# Patient Record
Sex: FEMALE | Race: WHITE | HISPANIC OR LATINO | Employment: UNEMPLOYED | URBAN - METROPOLITAN AREA
[De-identification: names, ages, dates, MRNs, and addresses within clinical notes are randomized per-mention and may not be internally consistent; named-entity substitution may affect disease eponyms.]

---

## 2019-08-04 ENCOUNTER — HOSPITAL ENCOUNTER (EMERGENCY)
Facility: HOSPITAL | Age: 44
Discharge: HOME | End: 2019-08-04
Attending: EMERGENCY MEDICINE | Admitting: EMERGENCY MEDICINE
Payer: MEDICARE

## 2019-08-04 ENCOUNTER — APPOINTMENT (EMERGENCY)
Dept: RADIOLOGY | Facility: HOSPITAL | Age: 44
End: 2019-08-04
Payer: MEDICARE

## 2019-08-04 VITALS
HEART RATE: 64 BPM | WEIGHT: 180 LBS | RESPIRATION RATE: 17 BRPM | DIASTOLIC BLOOD PRESSURE: 100 MMHG | OXYGEN SATURATION: 100 % | HEIGHT: 70 IN | TEMPERATURE: 98.3 F | BODY MASS INDEX: 25.77 KG/M2 | SYSTOLIC BLOOD PRESSURE: 147 MMHG

## 2019-08-04 DIAGNOSIS — S93.401A RIGHT ANKLE SPRAIN: ICD-10-CM

## 2019-08-04 DIAGNOSIS — S82.899A: ICD-10-CM

## 2019-08-04 DIAGNOSIS — S82.852A TRIMALLEOLAR FRACTURE OF ANKLE, CLOSED, LEFT, INITIAL ENCOUNTER: Primary | ICD-10-CM
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
## 2019-08-04 LAB
ANION GAP SERPL CALCULATED.3IONS-SCNC: 6 MMOL/L (ref 4–13)
APTT PPP: 26 SECONDS (ref 23–37)
BASOPHILS # BLD AUTO: 0.04 THOUSANDS/ΜL (ref 0–0.1)
BASOPHILS NFR BLD AUTO: 1 % (ref 0–1)
BUN SERPL-MCNC: 19 MG/DL (ref 5–25)
CALCIUM SERPL-MCNC: 8.5 MG/DL (ref 8.3–10.1)
CHLORIDE SERPL-SCNC: 104 MMOL/L (ref 100–108)
CO2 SERPL-SCNC: 28 MMOL/L (ref 21–32)
CREAT SERPL-MCNC: 0.77 MG/DL (ref 0.6–1.3)
EOSINOPHIL # BLD AUTO: 0.06 THOUSAND/ΜL (ref 0–0.61)
EOSINOPHIL NFR BLD AUTO: 1 % (ref 0–6)
ERYTHROCYTE [DISTWIDTH] IN BLOOD BY AUTOMATED COUNT: 16 % (ref 11.6–15.1)
GFR SERPL CREATININE-BSD FRML MDRD: 95 ML/MIN/1.73SQ M
GLUCOSE SERPL-MCNC: 91 MG/DL (ref 65–140)
HCT VFR BLD AUTO: 33.7 % (ref 34.8–46.1)
HGB BLD-MCNC: 10.1 G/DL (ref 11.5–15.4)
IMM GRANULOCYTES # BLD AUTO: 0.04 THOUSAND/UL (ref 0–0.2)
IMM GRANULOCYTES NFR BLD AUTO: 1 % (ref 0–2)
INR PPP: 1 (ref 0.84–1.19)
LYMPHOCYTES # BLD AUTO: 1.07 THOUSANDS/ΜL (ref 0.6–4.47)
LYMPHOCYTES NFR BLD AUTO: 13 % (ref 14–44)
MCH RBC QN AUTO: 26.4 PG (ref 26.8–34.3)
MCHC RBC AUTO-ENTMCNC: 30 G/DL (ref 31.4–37.4)
MCV RBC AUTO: 88 FL (ref 82–98)
MONOCYTES # BLD AUTO: 0.7 THOUSAND/ΜL (ref 0.17–1.22)
MONOCYTES NFR BLD AUTO: 8 % (ref 4–12)
NEUTROPHILS # BLD AUTO: 6.67 THOUSANDS/ΜL (ref 1.85–7.62)
NEUTS SEG NFR BLD AUTO: 76 % (ref 43–75)
NRBC BLD AUTO-RTO: 0 /100 WBCS
PLATELET # BLD AUTO: 290 THOUSANDS/UL (ref 149–390)
PMV BLD AUTO: 9.2 FL (ref 8.9–12.7)
POTASSIUM SERPL-SCNC: 3.5 MMOL/L (ref 3.5–5.3)
PROTHROMBIN TIME: 12.6 SECONDS (ref 11.6–14.5)
RBC # BLD AUTO: 3.83 MILLION/UL (ref 3.81–5.12)
SODIUM SERPL-SCNC: 138 MMOL/L (ref 136–145)
WBC # BLD AUTO: 8.58 THOUSAND/UL (ref 4.31–10.16)

## 2019-08-04 PROCEDURE — 85610 PROTHROMBIN TIME: CPT | Performed by: EMERGENCY MEDICINE

## 2019-08-04 PROCEDURE — 96361 HYDRATE IV INFUSION ADD-ON: CPT

## 2019-08-04 PROCEDURE — 80048 BASIC METABOLIC PNL TOTAL CA: CPT | Performed by: EMERGENCY MEDICINE

## 2019-08-04 PROCEDURE — 96360 HYDRATION IV INFUSION INIT: CPT

## 2019-08-04 PROCEDURE — 73610 X-RAY EXAM OF ANKLE: CPT

## 2019-08-04 PROCEDURE — 99285 EMERGENCY DEPT VISIT HI MDM: CPT | Performed by: EMERGENCY MEDICINE

## 2019-08-04 PROCEDURE — 85025 COMPLETE CBC W/AUTO DIFF WBC: CPT | Performed by: EMERGENCY MEDICINE

## 2019-08-04 PROCEDURE — 36415 COLL VENOUS BLD VENIPUNCTURE: CPT | Performed by: EMERGENCY MEDICINE

## 2019-08-04 PROCEDURE — 99284 EMERGENCY DEPT VISIT MOD MDM: CPT

## 2019-08-04 PROCEDURE — 85730 THROMBOPLASTIN TIME PARTIAL: CPT | Performed by: EMERGENCY MEDICINE

## 2019-08-04 PROCEDURE — 99152 MOD SED SAME PHYS/QHP 5/>YRS: CPT | Performed by: EMERGENCY MEDICINE

## 2019-08-04 PROCEDURE — 27818 TREATMENT OF ANKLE FRACTURE: CPT | Performed by: EMERGENCY MEDICINE

## 2019-08-04 RX ORDER — FENTANYL CITRATE 50 UG/ML
50 INJECTION, SOLUTION INTRAMUSCULAR; INTRAVENOUS ONCE
Status: COMPLETED | OUTPATIENT
Start: 2019-08-04 | End: 2019-08-04

## 2019-08-04 RX ORDER — OXYCODONE HYDROCHLORIDE AND ACETAMINOPHEN 5; 325 MG/1; MG/1
1 TABLET ORAL EVERY 4 HOURS PRN
Qty: 20 TABLET | Refills: 0 | Status: SHIPPED | OUTPATIENT
Start: 2019-08-04 | End: 2019-08-14

## 2019-08-04 RX ORDER — HYDROCHLOROTHIAZIDE 12.5 MG/1
12.5 TABLET ORAL DAILY
COMMUNITY
End: 2021-05-05 | Stop reason: SDUPTHER

## 2019-08-04 RX ORDER — PROPOFOL 10 MG/ML
100 INJECTION, EMULSION INTRAVENOUS ONCE
Status: COMPLETED | OUTPATIENT
Start: 2019-08-04 | End: 2019-08-04

## 2019-08-04 RX ORDER — BUPROPION HYDROCHLORIDE 200 MG/1
200 TABLET, EXTENDED RELEASE ORAL DAILY
COMMUNITY

## 2019-08-04 RX ORDER — LISINOPRIL 10 MG/1
10 TABLET ORAL DAILY
COMMUNITY

## 2019-08-04 RX ADMIN — PROPOFOL 100 MG: 10 INJECTION, EMULSION INTRAVENOUS at 16:35

## 2019-08-04 RX ADMIN — FENTANYL CITRATE 50 MCG: 50 INJECTION, SOLUTION INTRAMUSCULAR; INTRAVENOUS at 16:40

## 2019-08-04 RX ADMIN — SODIUM CHLORIDE 1000 ML: 0.9 INJECTION, SOLUTION INTRAVENOUS at 15:53

## 2019-08-04 NOTE — ED PROVIDER NOTES
History  Chief Complaint   Patient presents with    Ankle Injury     Pt  c/o left ankle pain after walking down one step and falling last night  Pt  took ibuprofen about two hours ago  59-year-old female presents the emergency department for evaluation of severe left ankle pain  Patient states that she tripped when walking down 1 step last night  She fell and twisted the left ankle  She has developed severe pain and swelling of the ankle since the fall  Patient did admit to drinking alcohol last night at the time of the fall  She denies head injury  No neck pain  She does notice mild right ankle pain as well but states that she has been ambulating on the right leg only today as she has not been able to bear weight on the left leg  Patient states that she had a previous left ankle fracture  She did not have previous surgery for the left ankle fracture and states that it was injured when she was a teenager  History provided by:  Patient and medical records   used: No    Ankle Injury   Location:  Left ankle  Quality:  Pain  Severity:  Severe  Onset quality:  Sudden  Timing:  Constant  Progression:  Unchanged  Chronicity:  New  Context:  Patient tripped while intoxicated  Relieved by:  Nothing  Worsened by: Movement  Ineffective treatments: Ice  Associated symptoms: no abdominal pain, no chest pain, no headaches, no nausea, no shortness of breath and no vomiting        Prior to Admission Medications   Prescriptions Last Dose Informant Patient Reported? Taking?    buPROPion (WELLBUTRIN SR) 200 MG 12 hr tablet   Yes Yes   Sig: Take 200 mg by mouth daily   hydrochlorothiazide (HYDRODIURIL) 12 5 mg tablet   Yes Yes   Sig: Take 12 5 mg by mouth daily   lisinopril (ZESTRIL) 10 mg tablet   Yes Yes   Sig: Take 10 mg by mouth daily      Facility-Administered Medications: None       Past Medical History:   Diagnosis Date    Hypertension     Psychiatric disorder        Past Surgical History:   Procedure Laterality Date    TUBAL LIGATION         No family history on file  I have reviewed and agree with the history as documented  Social History     Tobacco Use    Smoking status: Never Smoker   Substance Use Topics    Alcohol use: Yes     Comment: social     Drug use: Never        Review of Systems   Respiratory: Negative for shortness of breath  Cardiovascular: Negative for chest pain  Gastrointestinal: Negative for abdominal pain, nausea and vomiting  Musculoskeletal: Positive for gait problem and joint swelling  Negative for neck pain  Neurological: Negative for headaches  All other systems reviewed and are negative  Physical Exam  Physical Exam   Constitutional: She is oriented to person, place, and time  She appears well-developed and well-nourished  She appears distressed (Mild)  HENT:   Head: Normocephalic  Nose: Nose normal    Mouth/Throat: Oropharynx is clear and moist  No oropharyngeal exudate  Eyes: Pupils are equal, round, and reactive to light  Conjunctivae and EOM are normal    Neck: Normal range of motion  Neck supple  Cardiovascular: Normal rate, regular rhythm, normal heart sounds and intact distal pulses  Pulmonary/Chest: Effort normal and breath sounds normal    Abdominal: Soft  Bowel sounds are normal  She exhibits no distension  There is no tenderness  There is no rebound and no guarding  Musculoskeletal: She exhibits no edema  Right ankle: She exhibits swelling  She exhibits normal range of motion, no ecchymosis, no deformity, no laceration and normal pulse  Tenderness  Lateral malleolus tenderness found  No medial malleolus, no AITFL, no posterior TFL, no head of 5th metatarsal and no proximal fibula tenderness found  Achilles tendon normal         Left ankle: She exhibits decreased range of motion, swelling and deformity  She exhibits no laceration and normal pulse  Tenderness   Lateral malleolus and medial malleolus tenderness found  No head of 5th metatarsal and no proximal fibula tenderness found  Achilles tendon normal         Feet:    Lymphadenopathy:     She has no cervical adenopathy  Neurological: She is alert and oriented to person, place, and time  She has normal strength and normal reflexes  No cranial nerve deficit or sensory deficit  She exhibits normal muscle tone  Coordination and gait normal    Skin: Skin is warm, dry and intact  No rash noted  Psychiatric: She has a normal mood and affect  Her behavior is normal  Judgment and thought content normal    Nursing note and vitals reviewed        Vital Signs  ED Triage Vitals   Temperature Pulse Respirations Blood Pressure SpO2   08/04/19 1453 08/04/19 1453 08/04/19 1453 08/04/19 1453 08/04/19 1453   98 3 °F (36 8 °C) 73 18 122/79 100 %      Temp Source Heart Rate Source Patient Position - Orthostatic VS BP Location FiO2 (%)   08/04/19 1453 08/04/19 1636 -- 08/04/19 1636 --   Oral Monitor  Left arm       Pain Score       08/04/19 1453       Worst Possible Pain           Vitals:    08/04/19 1654 08/04/19 1656 08/04/19 1659 08/04/19 1800   BP: 135/79   147/100   Pulse: 67 70 68 64         Visual Acuity      ED Medications  Medications   propofol (DIPRIVAN) 200 MG/20ML bolus injection 100 mg (100 mg Intravenous Given by Other 8/4/19 1635)   sodium chloride 0 9 % bolus 1,000 mL (0 mL Intravenous Stopped 8/4/19 1850)   fentanyl citrate (PF) 100 MCG/2ML 50 mcg (50 mcg Intravenous Given 8/4/19 1640)       Diagnostic Studies  Results Reviewed     Procedure Component Value Units Date/Time    Basic metabolic panel [[id number]] Collected:  08/04/19 1608    Lab Status:  Final result Specimen:  Blood from Arm, Left Updated:  08/04/19 1629     Sodium 138 mmol/L      Potassium 3 5 mmol/L      Chloride 104 mmol/L      CO2 28 mmol/L      ANION GAP 6 mmol/L      BUN 19 mg/dL      Creatinine 0 77 mg/dL      Glucose 91 mg/dL      Calcium 8 5 mg/dL      eGFR 95 ml/min/1 73sq m     Narrative: National Kidney Disease Foundation guidelines for Chronic Kidney Disease (CKD):     Stage 1 with normal or high GFR (GFR > 90 mL/min/1 73 square meters)    Stage 2 Mild CKD (GFR = 60-89 mL/min/1 73 square meters)    Stage 3A Moderate CKD (GFR = 45-59 mL/min/1 73 square meters)    Stage 3B Moderate CKD (GFR = 30-44 mL/min/1 73 square meters)    Stage 4 Severe CKD (GFR = 15-29 mL/min/1 73 square meters)    Stage 5 End Stage CKD (GFR <15 mL/min/1 73 square meters)  Note: GFR calculation is accurate only with a steady state creatinine    Protime-INR [[id number]]  (Normal) Collected:  08/04/19 1555    Lab Status:  Final result Specimen:  Blood from Arm, Right Updated:  08/04/19 1623     Protime 12 6 seconds      INR 1 00    APTT [[id number]]  (Normal) Collected:  08/04/19 1555    Lab Status:  Final result Specimen:  Blood from Arm, Right Updated:  08/04/19 1623     PTT 26 seconds     CBC and differential [[id number]]  (Abnormal) Collected:  08/04/19 1608    Lab Status:  Final result Specimen:  Blood from Arm, Left Updated:  08/04/19 1615     WBC 8 58 Thousand/uL      RBC 3 83 Million/uL      Hemoglobin 10 1 g/dL      Hematocrit 33 7 %      MCV 88 fL      MCH 26 4 pg      MCHC 30 0 g/dL      RDW 16 0 %      MPV 9 2 fL      Platelets 435 Thousands/uL      nRBC 0 /100 WBCs      Neutrophils Relative 76 %      Immat GRANS % 1 %      Lymphocytes Relative 13 %      Monocytes Relative 8 %      Eosinophils Relative 1 %      Basophils Relative 1 %      Neutrophils Absolute 6 67 Thousands/µL      Immature Grans Absolute 0 04 Thousand/uL      Lymphocytes Absolute 1 07 Thousands/µL      Monocytes Absolute 0 70 Thousand/µL      Eosinophils Absolute 0 06 Thousand/µL      Basophils Absolute 0 04 Thousands/µL                  XR ankle 3+ views RIGHT   ED Interpretation by Adam Hutchinson DO (08/04 1807)   Soft tissue swelling no fracture      XR ankle 3+ views LEFT   ED Interpretation by Adam Hutchinson DO (08/04 1750)   Reduction of trimall fx and dislocation      XR ankle 3+ views LEFT   ED Interpretation by Lorin Macario DO (08/04 1533)   trimall fracture with dislocation                 Procedures  Orthopedic injury treatment  Date/Time: 8/4/2019 4:58 PM  Performed by: Lorin Macario DO  Authorized by: Lorin Macario DO     Patient Location:  ED  Other Assisting Provider: Yes (comment) Braydon Sun MD)    Verbal consent obtained?: Yes    Written consent obtained?: Yes    Risks and benefits: Risks, benefits and alternatives were discussed    Consent given by:  Patient  Patient states understanding of procedure being performed: Yes    Patient's understanding of procedure matches consent: Yes    Radiology Images displayed and confirmed  If images not available, report reviewed: Yes    Patient identity confirmed:  Verbally with patient  Time out: Immediately prior to the procedure a time out was called    Injury location:  Ankle  Location details:  Left ankle  Injury type:  Fracture-dislocation  Neurovascular status: Neurovascularly intact    Distal perfusion: normal    Neurological function: normal    Range of motion: reduced    Local anesthesia used?: No    General anesthesia used?: No    Sedation type:   Moderate (conscious) sedation (See separate Procedural Sedation form)  Manipulation performed?: Yes    Skin traction used?: No    Skeletal traction used?: No    Reduction successful?: Yes    Confirmation: Reduction confirmed by x-ray    Immobilization:  Splint  Splint type:  Short leg  Supplies used:  Ortho-Glass  Neurovascular status: Neurovascularly intact    Distal perfusion: normal    Neurological function: normal    Range of motion: improved    Patient tolerance:  Patient tolerated the procedure well with no immediate complications    Pre-Procedural Sedation  Performed by: Lorin Macario DO  Authorized by: Lorin Macario DO     Consent:     Consent obtained:  Verbal and written    Consent given by:  Patient    Risks discussed:  Inadequate sedation, dysrhythmia, allergic reaction, respiratory compromise necessitating ventilatory assistance and intubation, vomiting and nausea    Alternatives discussed:  Analgesia without sedation  Universal protocol:     Procedure explained and questions answered to patient or proxy's satisfaction: yes      Radiology Images displayed and confirmed    If images not available, report reviewed: yes      Immediately prior to procedure a time out was called: yes      Patient identity confirmation method:  Verbally with patient and arm band  Indications:     Sedation purpose:  Dislocation reduction    Procedure necessitating sedation performed by:  Different physician    Intended level of sedation:  Moderate (conscious sedation)  Pre-sedation assessment:     NPO status caution: urgency dictates proceeding with non-ideal NPO status      ASA classification: class 1 - normal, healthy patient      Neck mobility: normal      Mouth opening:  3 or more finger widths    Thyromental distance:  3 finger widths    Mallampati score:  I - soft palate, uvula, fauces, pillars visible    Pre-sedation assessments completed and reviewed: airway patency, cardiovascular function, hydration status, mental status, nausea/vomiting, pain level, respiratory function and temperature      History of difficult intubation: no      Pre-sedation assessment completed:  8/4/2019 4:22 PM  Procedural Sedation  Date/Time: 8/4/2019 4:52 PM  Performed by: Meredith Mitchell DO  Authorized by: Meredith Mitchell DO     Immediate pre-procedure details:     Reassessment: Patient reassessed immediately prior to procedure      Reviewed: vital signs      Verified: bag valve mask available, emergency equipment available, intubation equipment available, IV patency confirmed, oxygen available and suction available    Procedure details (see MAR for exact dosages):     Preoxygenation:  Room air    Sedation:  Propofol    Analgesia:  Fentanyl    Intra-procedure monitoring:  Blood pressure monitoring, cardiac monitor, continuous capnometry, continuous pulse oximetry, frequent LOC assessments and frequent vital sign checks    Intra-procedure events: none    Post-procedure details:     Post-sedation assessment completed:  8/4/2019 5:35 PM    Attendance: Constant attendance by certified staff until patient recovered      Recovery: Patient returned to pre-procedure baseline      Post-sedation assessments completed and reviewed: airway patency, cardiovascular function, hydration status, mental status, nausea/vomiting, pain level, respiratory function and temperature      Patient is stable for discharge or admission: yes      Patient tolerance: Tolerated well, no immediate complications  Splint application  Date/Time: 8/4/2019 4:56 PM  Performed by: Cassidy Olivares DO  Authorized by: Cassidy Olivares DO     Patient location:  Bedside  Procedure performed by emergency physician: Yes    Other Assisting Provider: Yes (comment)    Consent:     Consent obtained:  Verbal    Consent given by:  Patient    Risks discussed:  Numbness, pain and swelling    Alternatives discussed:  No treatment  Universal protocol:     Patient identity confirmed:  Verbally with patient and arm band  Indication:     Indications: fracture    Pre-procedure details:     Sensation:  Normal  Procedure details:     Laterality:  Left    Location:  Ankle    Ankle:  L ankle    Splint type:  Short leg and sugar tong    Supplies:  Ortho-Glass  Post-procedure details:     Pain:  Improved    Sensation:  Normal    Neurovascular Exam: skin pink, capillary refill <2 sec, normal pulses and skin intact, warm, and dry      Patient tolerance of procedure:   Tolerated well, no immediate complications      Conscious Sedation Assessment      Classification Score   ASA Scale Assessment  1-Healthy patient, no disease outside surgical process filed at 08/04/2019 1643           ED Course  ED Course as of Aug 04 2057   Alen Munson Aug 04, 2019   1802 Case reviewed with Dr Alexa Poon, pt  Can be d/c home with o/p follow up                                  MDM  Number of Diagnoses or Management Options  Closed fracture dislocation of ankle: new and requires workup  Fall: new and requires workup  Right ankle sprain: new and requires workup  Trimalleolar fracture of ankle, closed, left, initial encounter: new and requires workup     Amount and/or Complexity of Data Reviewed  Clinical lab tests: ordered and reviewed  Tests in the radiology section of CPT®: reviewed and ordered  Decide to obtain previous medical records or to obtain history from someone other than the patient: yes  Discuss the patient with other providers: yes (Dr Mar Mclain)  Independent visualization of images, tracings, or specimens: yes    Risk of Complications, Morbidity, and/or Mortality  General comments: 42-year-old female presents with left ankle deformity after falling down 1 step last night  Patient has a fracture dislocation that was successfully reduced and splinted in the emergency department  Patient's pain was well controlled and she will be discharged with pain medication  I have personally queried the [place] regarding this patient and I feel it is warranted to prescribe this patient the narcotic or benzodiazepine medications given at discharge       Patient Progress  Patient progress: improved      Disposition  Final diagnoses:   Trimalleolar fracture of ankle, closed, left, initial encounter   Closed fracture dislocation of ankle - left   Right ankle sprain   Fall     Time reflects when diagnosis was documented in both MDM as applicable and the Disposition within this note     Time User Action Codes Description Comment    8/4/2019  6:09 PM Mayra Avendaño Add [P28 056S] Trimalleolar fracture of ankle, closed, left, initial encounter     8/4/2019  6:09 PM Nidhi Ruelas Add [S93 05XA] Ankle dislocation, left, initial encounter     8/4/2019  6:10 PM Nidhi Ruelas Add [S82 [id number]] Closed fracture dislocation of ankle     8/4/2019  6:10 PM Nidhi Ruelas Remove [S93 05XA] Ankle dislocation, left, initial encounter     8/4/2019  6:10 PM Ivone Sharif Modify [B26 [id number]] Closed fracture dislocation of ankle left    8/4/2019  6:10 PM Nidhi Ruelas Add [S93 401A] Right ankle sprain     8/4/2019  6:10 PM Nidhi Ruelas Add [W19  XXXA] Fall       ED Disposition     ED Disposition Condition Date/Time Comment    Discharge Stable Sun Aug 4, 2019  6:08 PM John  discharge to home/self care  Follow-up Information     Follow up With Specialties Details Why Contact Info Additional [address] [facility] [place] [facility] Schedule an appointment as soon as possible for a visit in 1 day for fracture evaluation and treatment [address] [place] [phone] [facility] [address], [address] [place], [place], [address]          Discharge Medication List as of 8/4/2019  6:12 PM      START taking these medications    Details   oxyCODONE-acetaminophen (PERCOCET) 5-325 mg per tablet Take 1 tablet by mouth every 4 (four) hours as needed for moderate pain for up to 10 daysMax Daily Amount: 6 tablets, Starting Sun 8/4/2019, Until Wed 8/14/2019, Print         CONTINUE these medications which have NOT CHANGED    Details   buPROPion (WELLBUTRIN SR) 200 MG 12 hr tablet Take 200 mg by mouth daily, Historical Med      hydrochlorothiazide (HYDRODIURIL) 12 5 mg tablet Take 12 5 mg by mouth daily, Historical Med      lisinopril (ZESTRIL) 10 mg tablet Take 10 mg by mouth daily, Historical Med           No discharge procedures on file      ED Provider  Electronically Signed by           Temo Locke DO  08/04/19 9933

## 2019-09-11 ENCOUNTER — TELEPHONE (OUTPATIENT)
Dept: OBGYN CLINIC | Facility: CLINIC | Age: 44
End: 2019-09-11

## 2019-11-15 ENCOUNTER — TRANSCRIBE ORDERS (OUTPATIENT)
Dept: ADMINISTRATIVE | Facility: HOSPITAL | Age: 44
End: 2019-11-15

## 2019-11-15 DIAGNOSIS — N94.10 PAIN IN FEMALE GENITALIA ON INTERCOURSE: ICD-10-CM

## 2019-11-15 DIAGNOSIS — N92.0 EXCESSIVE OR FREQUENT MENSTRUATION: ICD-10-CM

## 2019-11-15 DIAGNOSIS — N94.6 MENORRHALGIA: Primary | ICD-10-CM

## 2019-12-29 ENCOUNTER — APPOINTMENT (OUTPATIENT)
Dept: LAB | Facility: HOSPITAL | Age: 44
End: 2019-12-29
Payer: MEDICARE

## 2019-12-29 ENCOUNTER — TRANSCRIBE ORDERS (OUTPATIENT)
Dept: LAB | Facility: HOSPITAL | Age: 44
End: 2019-12-29

## 2019-12-29 ENCOUNTER — HOSPITAL ENCOUNTER (OUTPATIENT)
Dept: RADIOLOGY | Facility: HOSPITAL | Age: 44
Discharge: HOME | End: 2019-12-29
Payer: MEDICARE

## 2019-12-29 DIAGNOSIS — N94.10 PAIN IN FEMALE GENITALIA ON INTERCOURSE: ICD-10-CM

## 2019-12-29 DIAGNOSIS — N94.6 MENORRHALGIA: ICD-10-CM

## 2019-12-29 DIAGNOSIS — E66.3 SEVERELY OVERWEIGHT: ICD-10-CM

## 2019-12-29 DIAGNOSIS — N92.0 EXCESSIVE OR FREQUENT MENSTRUATION: ICD-10-CM

## 2019-12-29 DIAGNOSIS — I10 ESSENTIAL HYPERTENSION, MALIGNANT: Primary | ICD-10-CM

## 2019-12-29 DIAGNOSIS — I10 ESSENTIAL HYPERTENSION, MALIGNANT: ICD-10-CM

## 2019-12-29 LAB
ALBUMIN SERPL BCP-MCNC: 3.3 G/DL (ref 3.5–5)
ALP SERPL-CCNC: 81 U/L (ref 46–116)
ALT SERPL W P-5'-P-CCNC: 20 U/L (ref 12–78)
ANION GAP SERPL CALCULATED.3IONS-SCNC: 3 MMOL/L (ref 4–13)
AST SERPL W P-5'-P-CCNC: 11 U/L (ref 5–45)
BASOPHILS # BLD AUTO: 0.03 THOUSANDS/ΜL (ref 0–0.1)
BASOPHILS NFR BLD AUTO: 0 % (ref 0–1)
BILIRUB SERPL-MCNC: 0.27 MG/DL (ref 0.2–1)
BUN SERPL-MCNC: 11 MG/DL (ref 5–25)
CALCIUM SERPL-MCNC: 8.7 MG/DL (ref 8.3–10.1)
CHLORIDE SERPL-SCNC: 107 MMOL/L (ref 100–108)
CHOLEST SERPL-MCNC: 154 MG/DL (ref 50–200)
CO2 SERPL-SCNC: 28 MMOL/L (ref 21–32)
CREAT SERPL-MCNC: 0.76 MG/DL (ref 0.6–1.3)
EOSINOPHIL # BLD AUTO: 0.17 THOUSAND/ΜL (ref 0–0.61)
EOSINOPHIL NFR BLD AUTO: 2 % (ref 0–6)
ERYTHROCYTE [DISTWIDTH] IN BLOOD BY AUTOMATED COUNT: 16.5 % (ref 11.6–15.1)
EST. AVERAGE GLUCOSE BLD GHB EST-MCNC: 91 MG/DL
FERRITIN SERPL-MCNC: 6 NG/ML (ref 8–388)
GFR SERPL CREATININE-BSD FRML MDRD: 96 ML/MIN/1.73SQ M
GLUCOSE P FAST SERPL-MCNC: 90 MG/DL (ref 65–99)
HBA1C MFR BLD: 4.8 % (ref 4.2–6.3)
HCT VFR BLD AUTO: 30.2 % (ref 34.8–46.1)
HDLC SERPL-MCNC: 48 MG/DL
HGB BLD-MCNC: 8.8 G/DL (ref 11.5–15.4)
IMM GRANULOCYTES # BLD AUTO: 0.02 THOUSAND/UL (ref 0–0.2)
IMM GRANULOCYTES NFR BLD AUTO: 0 % (ref 0–2)
IRON SATN MFR SERPL: 5 %
IRON SERPL-MCNC: 20 UG/DL (ref 50–170)
LDLC SERPL CALC-MCNC: 92 MG/DL (ref 0–100)
LYMPHOCYTES # BLD AUTO: 1.57 THOUSANDS/ΜL (ref 0.6–4.47)
LYMPHOCYTES NFR BLD AUTO: 20 % (ref 14–44)
MCH RBC QN AUTO: 24.2 PG (ref 26.8–34.3)
MCHC RBC AUTO-ENTMCNC: 29.1 G/DL (ref 31.4–37.4)
MCV RBC AUTO: 83 FL (ref 82–98)
MONOCYTES # BLD AUTO: 0.49 THOUSAND/ΜL (ref 0.17–1.22)
MONOCYTES NFR BLD AUTO: 6 % (ref 4–12)
NEUTROPHILS # BLD AUTO: 5.43 THOUSANDS/ΜL (ref 1.85–7.62)
NEUTS SEG NFR BLD AUTO: 72 % (ref 43–75)
NONHDLC SERPL-MCNC: 106 MG/DL
NRBC BLD AUTO-RTO: 0 /100 WBCS
PLATELET # BLD AUTO: 362 THOUSANDS/UL (ref 149–390)
PMV BLD AUTO: 9.8 FL (ref 8.9–12.7)
POTASSIUM SERPL-SCNC: 3.9 MMOL/L (ref 3.5–5.3)
PROT SERPL-MCNC: 6.8 G/DL (ref 6.4–8.2)
RBC # BLD AUTO: 3.64 MILLION/UL (ref 3.81–5.12)
SODIUM SERPL-SCNC: 138 MMOL/L (ref 136–145)
TIBC SERPL-MCNC: 391 UG/DL (ref 250–450)
TRIGL SERPL-MCNC: 68 MG/DL
WBC # BLD AUTO: 7.71 THOUSAND/UL (ref 4.31–10.16)

## 2019-12-29 PROCEDURE — 36415 COLL VENOUS BLD VENIPUNCTURE: CPT

## 2019-12-29 PROCEDURE — 82728 ASSAY OF FERRITIN: CPT

## 2019-12-29 PROCEDURE — 83036 HEMOGLOBIN GLYCOSYLATED A1C: CPT

## 2019-12-29 PROCEDURE — 76856 US EXAM PELVIC COMPLETE: CPT

## 2019-12-29 PROCEDURE — 76830 TRANSVAGINAL US NON-OB: CPT

## 2019-12-29 PROCEDURE — 83540 ASSAY OF IRON: CPT

## 2019-12-29 PROCEDURE — 80053 COMPREHEN METABOLIC PANEL: CPT

## 2019-12-29 PROCEDURE — 80061 LIPID PANEL: CPT

## 2019-12-29 PROCEDURE — 85025 COMPLETE CBC W/AUTO DIFF WBC: CPT

## 2019-12-29 PROCEDURE — 83550 IRON BINDING TEST: CPT

## 2020-01-07 ENCOUNTER — OFFICE VISIT (OUTPATIENT)
Dept: OBGYN CLINIC | Facility: CLINIC | Age: 45
End: 2020-01-07
Payer: MEDICARE

## 2020-01-07 VITALS
WEIGHT: 183 LBS | SYSTOLIC BLOOD PRESSURE: 142 MMHG | DIASTOLIC BLOOD PRESSURE: 90 MMHG | BODY MASS INDEX: 26.2 KG/M2 | HEIGHT: 70 IN

## 2020-01-07 DIAGNOSIS — D25.1 INTRAMURAL LEIOMYOMA OF UTERUS: ICD-10-CM

## 2020-01-07 DIAGNOSIS — N92.0 MENORRHAGIA WITH REGULAR CYCLE: Primary | ICD-10-CM

## 2020-01-07 DIAGNOSIS — D25.2 FIBROIDS, SUBSEROUS: ICD-10-CM

## 2020-01-07 PROBLEM — S82.852D CLOSED TRIMALLEOLAR FRACTURE OF LEFT ANKLE WITH ROUTINE HEALING: Status: ACTIVE | Noted: 2019-08-08

## 2020-01-07 PROBLEM — S82.899A ANKLE FRACTURE: Status: ACTIVE | Noted: 2019-08-19

## 2020-01-07 PROCEDURE — 99203 OFFICE O/P NEW LOW 30 MIN: CPT | Performed by: NURSE PRACTITIONER

## 2020-01-07 RX ORDER — GABAPENTIN 300 MG/1
300 CAPSULE ORAL
COMMUNITY
Start: 2019-12-31 | End: 2021-05-05 | Stop reason: SDUPTHER

## 2020-01-07 RX ORDER — TRAMADOL HYDROCHLORIDE 50 MG/1
50 TABLET ORAL EVERY 6 HOURS PRN
COMMUNITY
Start: 2019-10-24 | End: 2020-10-23

## 2020-01-07 RX ORDER — LORATADINE 10 MG/1
10 TABLET ORAL DAILY
COMMUNITY
Start: 2019-12-31

## 2020-01-07 NOTE — PROGRESS NOTES
Hiral Snyder 40year-old  with 3 prior vaginal deliveries  She recently moved from [facility]  She has been seen at the [facility] in [place] on [address]  She was referred here for further evaluation after US findings of a fibroid uterus  She presents with complaint of heavy menses which started 1 year ago  Her menses are every 28 days and they now last 10 days  She is changing her pads every hour the first 4 days and then the flow slows down and she can change less frequently  She passes clots quarter size  She has associated pelvic pain  She has a history of tubal ligation  Recent US on 19 ordered by the [facility]   Impression:    1  Heterogeneous uterus with at least two, though possibly more, fibroids abutting the endometrium suggesting submucosal components  As there are also myometrial cyst(s), there may be coexisting adenomyosis  If clinically indicated, evaluation of   fibroids and underlying adenomyosis would be better performed with MRI      2  Hypoechoic mass in the cervix along the endocervical canal   This is likely a fibroid given similarity in appearance to the other lesions and is either submucosal or possibly intracavitary  Endocervical polyp is also possible  This would also be   better evaluated with MRI  Last pap smear: Summer 2019 in [facility] - normal per patient  ROS:  As indicated in HPI  All other ROS negative  Physical Exam   Constitutional: Vital signs are normal  She appears well-developed and well-nourished  Genitourinary: Vagina normal  Pelvic exam was performed with patient supine  There is no rash, tenderness, lesion or injury on the right labia  There is no rash, tenderness, lesion or injury on the left labia  Right adnexum does not display mass, does not display tenderness and does not display fullness  Left adnexum does not display mass, does not display tenderness and does not display fullness  Cervix is parous   Cervix does not exhibit motion tenderness or discharge  Uterus is enlarged  Genitourinary Comments: Fibroid uterus palpated on bi-manual exam    HENT:   Head: Normocephalic and atraumatic  Neck: Neck supple  Neurological: She is alert  Skin: Skin is warm and dry  Nursing note and vitals reviewed  Kade Sawyer was seen today for fibroids and menorrhagia  Diagnoses and all orders for this visit:    Menorrhagia with regular cycle  -     Ambulatory referral to Gynecology; Future    Intramural leiomyoma of uterus  -     Ambulatory referral to Gynecology; Future    Fibroids, subserous  -     Ambulatory referral to Gynecology; Future      I recommended patient have a consultation with Dr Diana Acevedo for evaluation and treatment of her fibroid uterus  Patient agrees with plan  A referral was provided

## 2020-01-14 ENCOUNTER — TELEPHONE (OUTPATIENT)
Dept: OTHER | Facility: HOSPITAL | Age: 45
End: 2020-01-14

## 2020-01-14 NOTE — TELEPHONE ENCOUNTER
Patient was called to discuss appointment scheduled for today  She was seen by Dr Sissy Caceres office and follow up evaluation was recommended with Dr Geoffrey Lang due to her fibroid uterus  Patient scheduled an appointment with [facility] because she was informed that we are the same types of doctors  It was recommended that she follow up with a specialist as previously referred  Patient agreed  All questions answered at this time  Carmen Ramos MD  OB/GYN  1/14/2020  12:30 PM

## 2020-01-23 ENCOUNTER — CONSULT (OUTPATIENT)
Dept: GYNECOLOGY | Facility: CLINIC | Age: 45
End: 2020-01-23
Payer: MEDICARE

## 2020-01-23 VITALS
SYSTOLIC BLOOD PRESSURE: 138 MMHG | WEIGHT: 206 LBS | HEIGHT: 70 IN | BODY MASS INDEX: 29.49 KG/M2 | DIASTOLIC BLOOD PRESSURE: 80 MMHG | HEART RATE: 97 BPM

## 2020-01-23 DIAGNOSIS — D21.9 FIBROIDS: ICD-10-CM

## 2020-01-23 DIAGNOSIS — N92.0 MENORRHAGIA WITH REGULAR CYCLE: Primary | ICD-10-CM

## 2020-01-23 DIAGNOSIS — N80.0 ADENOMYOSIS: ICD-10-CM

## 2020-01-23 PROBLEM — D64.9 ANEMIA: Status: ACTIVE | Noted: 2020-01-23

## 2020-01-23 PROBLEM — D25.9 UTERINE LEIOMYOMA: Status: ACTIVE | Noted: 2020-01-23

## 2020-01-23 PROCEDURE — 88305 TISSUE EXAM BY PATHOLOGIST: CPT | Performed by: PATHOLOGY

## 2020-01-23 PROCEDURE — 58100 BIOPSY OF UTERUS LINING: CPT | Performed by: OBSTETRICS & GYNECOLOGY

## 2020-01-23 PROCEDURE — 99204 OFFICE O/P NEW MOD 45 MIN: CPT | Performed by: OBSTETRICS & GYNECOLOGY

## 2020-01-23 NOTE — PROGRESS NOTES
Assessment/Plan    Diagnoses and all orders for this visit:    Menorrhagia with regular cycle  -     Tissue Exam    Fibroids    Adenomyosis      Discussed options including medical management versus surgical management  We discussed hysteroscopic myomectomy with or without endometrial ablation versus hysterectomy  We discussed supracervical versus total hysterectomy both with bilateral salpingectomy  We discussed the risks of the surgery including but not limited to infection, hemorrhage, bowel, bladder, vascular injury, possible necessity for laparotomy  Patient is opted proceed with an [facility]  Subjective:      Patient ID: Kota Castillo is a 40 y o  female  HPI  P3 referred to office by JEANNINE Quevedo/ Dr Yamel Gallego secondary to menorrhagia and anemia  Patient states that over the past year she has been experiencing extremely heavy menses with passage of large clots  This is associated with dysmenorrhea and lower back pain and increasing pelvic pressure  Recent hemoglobin was 8 8  Patient had an ultrasound which showed 3 fibroids the largest measuring 3 21 7 and 2 9 cm  The 2 9 cm suggested either cervical or submucosal fibroid  The ultrasound also suggested adenomyosis  Patient has had a prior tubal    The following portions of the patient's history were reviewed and updated as appropriate:   She  has a past medical history of Anemia, Fibroid uterus, Hypertension, Hypertension, and Psychiatric disorder  She   Patient Active Problem List    Diagnosis Date Noted    Ankle fracture 08/19/2019    Closed trimalleolar fracture of left ankle with routine healing 08/08/2019     She  has a past surgical history that includes Tubal ligation  Her family history includes Diabetes in her brother; Hypertension in her father and mother  She  reports that she has never smoked  She has never used smokeless tobacco  She reports that she drinks alcohol  She reports that she does not use drugs    Current Outpatient Medications Medication Sig Dispense Refill    buPROPion (WELLBUTRIN SR) 200 MG 12 hr tablet Take 200 mg by mouth daily      gabapentin (NEURONTIN) 300 mg capsule Take 300 mg by mouth daily at bedtime      hydrochlorothiazide (HYDRODIURIL) 12 5 mg tablet Take 12 5 mg by mouth daily      lisinopril (ZESTRIL) 10 mg tablet Take 10 mg by mouth daily      loratadine (CLARITIN) 10 mg tablet Take 10 mg by mouth daily      traMADol (ULTRAM) 50 mg tablet Take 50 mg by mouth every 6 (six) hours as needed       No current facility-administered medications for this visit  Current Outpatient Medications on File Prior to Visit   Medication Sig    buPROPion (WELLBUTRIN SR) 200 MG 12 hr tablet Take 200 mg by mouth daily    gabapentin (NEURONTIN) 300 mg capsule Take 300 mg by mouth daily at bedtime    hydrochlorothiazide (HYDRODIURIL) 12 5 mg tablet Take 12 5 mg by mouth daily    lisinopril (ZESTRIL) 10 mg tablet Take 10 mg by mouth daily    loratadine (CLARITIN) 10 mg tablet Take 10 mg by mouth daily    traMADol (ULTRAM) 50 mg tablet Take 50 mg by mouth every 6 (six) hours as needed     No current facility-administered medications on file prior to visit  She has No Known Allergies       Review of Systems   Constitutional: Negative  HENT: Negative for sore throat and trouble swallowing  Respiratory: Negative  Cardiovascular: Negative  Gastrointestinal: Negative  Genitourinary: Positive for menstrual problem and pelvic pain  Negative for difficulty urinating, dyspareunia, enuresis, frequency, genital sores, hematuria and urgency  Objective:      /80   Pulse 97   Ht 5' 10" (1 778 m)   Wt 93 4 kg (206 lb)   LMP 12/26/2019   BMI 29 56 kg/m²          Physical Exam   Constitutional: She appears well-nourished  Neck: Normal range of motion  Neck supple  No thyromegaly present  Cardiovascular: Normal rate, regular rhythm and normal heart sounds     Pulmonary/Chest: Effort normal and breath sounds normal  No respiratory distress  Abdominal: Soft  Bowel sounds are normal  She exhibits no distension and no mass  There is no tenderness  There is no rebound and no guarding  No hernia  Hernia confirmed negative in the right inguinal area and confirmed negative in the left inguinal area  Genitourinary: There is no rash, tenderness or lesion on the right labia  There is no rash, tenderness or lesion on the left labia  Uterus is enlarged  Uterus is not deviated, not fixed and not tender  Cervix exhibits no motion tenderness, no discharge and no friability  Right adnexum displays no mass, no tenderness and no fullness  Left adnexum displays no mass, no tenderness and no fullness  No erythema, tenderness or bleeding in the vagina  No vaginal discharge found  Lymphadenopathy:     She has no cervical adenopathy  No inguinal adenopathy noted on the right or left side  Endometrial biopsy  Cervix was prepped with Betadine  Pipelle was inserted into the endometrial cavity  Pipelle sounded to 7 cm  Pipelle was removed in a circumferential manner obtaining a sample from all 4 quadrants    Patient tolerated procedure well

## 2020-01-23 NOTE — LETTER
January 23, 2020     Fernando Freitas, [address] [place] [address]    Patient: Sandy Garcia   YOB: 1975   Date of Visit: 1/23/2020       Dear Dr Osmany Morales: Thank you for referring Sandy Garcia to me for evaluation  Below are my notes for this consultation  If you have questions, please do not hesitate to call me  I look forward to following your patient along with you  Sincerely,        Pearl Tirado DO        CC: MD Pearl Mckeon DO  1/23/2020 11:03 AM  Incomplete  Assessment/Plan    Diagnoses and all orders for this visit:    Menorrhagia with regular cycle  -     Tissue Exam    Fibroids    Adenomyosis      Discussed options including medical management versus surgical management  We discussed hysteroscopic myomectomy with or without endometrial ablation versus hysterectomy  We discussed supracervical versus total hysterectomy both with bilateral salpingectomy  We discussed the risks of the surgery including but not limited to infection, hemorrhage, bowel, bladder, vascular injury, possible necessity for laparotomy  Patient is opted proceed with an [facility] BS  Subjective:      Patient ID: Sandy Garcia is a 40 y o  female  HPI  P3 referred to office by JEANNINE Quevedo/ Dr Michell Zarate secondary to menorrhagia and anemia  Patient states that over the past year she has been experiencing extremely heavy menses with passage of large clots  This is associated with dysmenorrhea and lower back pain and increasing pelvic pressure  Recent hemoglobin was 8 8  Patient had an ultrasound which showed 3 fibroids the largest measuring 3 21 7 and 2 9 cm  The 2 9 cm suggested either cervical or submucosal fibroid  The ultrasound also suggested adenomyosis    Patient has had a prior tubal    The following portions of the patient's history were reviewed and updated as appropriate:   She  has a past medical history of Anemia, Fibroid uterus, Hypertension, Hypertension, and Psychiatric disorder  She   Patient Active Problem List    Diagnosis Date Noted    Ankle fracture 08/19/2019    Closed trimalleolar fracture of left ankle with routine healing 08/08/2019     She  has a past surgical history that includes Tubal ligation  Her family history includes Diabetes in her brother; Hypertension in her father and mother  She  reports that she has never smoked  She has never used smokeless tobacco  She reports that she drinks alcohol  She reports that she does not use drugs  Current Outpatient Medications   Medication Sig Dispense Refill    buPROPion (WELLBUTRIN SR) 200 MG 12 hr tablet Take 200 mg by mouth daily      gabapentin (NEURONTIN) 300 mg capsule Take 300 mg by mouth daily at bedtime      hydrochlorothiazide (HYDRODIURIL) 12 5 mg tablet Take 12 5 mg by mouth daily      lisinopril (ZESTRIL) 10 mg tablet Take 10 mg by mouth daily      loratadine (CLARITIN) 10 mg tablet Take 10 mg by mouth daily      traMADol (ULTRAM) 50 mg tablet Take 50 mg by mouth every 6 (six) hours as needed       No current facility-administered medications for this visit  Current Outpatient Medications on File Prior to Visit   Medication Sig    buPROPion (WELLBUTRIN SR) 200 MG 12 hr tablet Take 200 mg by mouth daily    gabapentin (NEURONTIN) 300 mg capsule Take 300 mg by mouth daily at bedtime    hydrochlorothiazide (HYDRODIURIL) 12 5 mg tablet Take 12 5 mg by mouth daily    lisinopril (ZESTRIL) 10 mg tablet Take 10 mg by mouth daily    loratadine (CLARITIN) 10 mg tablet Take 10 mg by mouth daily    traMADol (ULTRAM) 50 mg tablet Take 50 mg by mouth every 6 (six) hours as needed     No current facility-administered medications on file prior to visit  She has No Known Allergies       Review of Systems   Constitutional: Negative  HENT: Negative for sore throat and trouble swallowing  Respiratory: Negative  Cardiovascular: Negative  Gastrointestinal: Negative  Genitourinary: Positive for menstrual problem and pelvic pain  Negative for difficulty urinating, dyspareunia, enuresis, frequency, genital sores, hematuria and urgency  Objective:      /80   Pulse 97   Ht 5' 10" (1 778 m)   Wt 93 4 kg (206 lb)   LMP 12/26/2019   BMI 29 56 kg/m²           Physical Exam   Constitutional: She appears well-nourished  Neck: Normal range of motion  Neck supple  No thyromegaly present  Cardiovascular: Normal rate, regular rhythm and normal heart sounds  Pulmonary/Chest: Effort normal and breath sounds normal  No respiratory distress  Abdominal: Soft  Bowel sounds are normal  She exhibits no distension and no mass  There is no tenderness  There is no rebound and no guarding  No hernia  Hernia confirmed negative in the right inguinal area and confirmed negative in the left inguinal area  Genitourinary: There is no rash, tenderness or lesion on the right labia  There is no rash, tenderness or lesion on the left labia  Uterus is enlarged  Uterus is not deviated, not fixed and not tender  Cervix exhibits no motion tenderness, no discharge and no friability  Right adnexum displays no mass, no tenderness and no fullness  Left adnexum displays no mass, no tenderness and no fullness  No erythema, tenderness or bleeding in the vagina  No vaginal discharge found  Lymphadenopathy:     She has no cervical adenopathy  No inguinal adenopathy noted on the right or left side  Endometrial biopsy  Cervix was prepped with Betadine  Pipelle was inserted into the endometrial cavity  Pipelle sounded to 7 cm  Pipelle was removed in a circumferential manner obtaining a sample from all 4 quadrants    Patient tolerated procedure well

## 2020-02-07 ENCOUNTER — ANESTHESIA EVENT (OUTPATIENT)
Dept: PERIOP | Facility: HOSPITAL | Age: 45
End: 2020-02-07
Payer: MEDICARE

## 2020-02-07 RX ORDER — MULTIVITAMIN WITH IRON
1 TABLET ORAL DAILY
COMMUNITY

## 2020-02-07 RX ORDER — ZINC GLUCONATE 50 MG
50 TABLET ORAL DAILY
COMMUNITY

## 2020-02-07 RX ORDER — ACETAMINOPHEN 325 MG/1
650 TABLET ORAL EVERY 6 HOURS PRN
COMMUNITY
End: 2021-05-05 | Stop reason: SDUPTHER

## 2020-02-07 RX ORDER — IBUPROFEN 200 MG
TABLET ORAL EVERY 6 HOURS PRN
COMMUNITY
End: 2021-05-05 | Stop reason: SDUPTHER

## 2020-02-07 RX ORDER — FERROUS SULFATE 325(65) MG
325 TABLET ORAL
COMMUNITY
End: 2021-05-05 | Stop reason: SDUPTHER

## 2020-02-07 NOTE — PRE-PROCEDURE INSTRUCTIONS
Pre-Surgery Instructions:   Medication Instructions    acetaminophen (TYLENOL) 325 mg tablet Patient was instructed by Physician and understands   buPROPion (WELLBUTRIN SR) 200 MG 12 hr tablet Patient was instructed by Physician and understands   ferrous sulfate 325 (65 Fe) mg tablet Patient was instructed by Physician and understands   gabapentin (NEURONTIN) 300 mg capsule Patient was instructed by Physician and understands   hydrochlorothiazide (HYDRODIURIL) 12 5 mg tablet Patient was instructed by Physician and understands   ibuprofen (MOTRIN) 200 mg tablet Patient was instructed by Physician and understands   lisinopril (ZESTRIL) 10 mg tablet Patient was instructed by Physician and understands   loratadine (CLARITIN) 10 mg tablet Patient was instructed by Physician and understands   Magnesium 250 MG TABS Patient was instructed by Physician and understands   Multiple Vitamins-Minerals (MULTIVITAMIN WOMEN PO) Patient was instructed by Physician and understands   zinc gluconate 50 mg tablet Patient was instructed by Physician and understands  Pt instructed to take wellbutrin with a small sip of water the morning of surgery  [facility] preop instructions reviewed with pt  Pt has surgical soap

## 2020-02-10 ENCOUNTER — HOSPITAL ENCOUNTER (OUTPATIENT)
Facility: HOSPITAL | Age: 45
Setting detail: OUTPATIENT SURGERY
Discharge: HOME | End: 2020-02-10
Attending: OBSTETRICS & GYNECOLOGY | Admitting: OBSTETRICS & GYNECOLOGY
Payer: MEDICARE

## 2020-02-10 ENCOUNTER — ANESTHESIA (OUTPATIENT)
Dept: PERIOP | Facility: HOSPITAL | Age: 45
End: 2020-02-10
Payer: MEDICARE

## 2020-02-10 VITALS
DIASTOLIC BLOOD PRESSURE: 83 MMHG | SYSTOLIC BLOOD PRESSURE: 136 MMHG | HEIGHT: 70 IN | OXYGEN SATURATION: 100 % | WEIGHT: 205 LBS | HEART RATE: 73 BPM | BODY MASS INDEX: 29.35 KG/M2 | RESPIRATION RATE: 18 BRPM | TEMPERATURE: 97.9 F

## 2020-02-10 DIAGNOSIS — N92.0 EXCESSIVE OR FREQUENT MENSTRUATION: ICD-10-CM

## 2020-02-10 DIAGNOSIS — D25.9 UTERINE LEIOMYOMA, UNSPECIFIED LOCATION: ICD-10-CM

## 2020-02-10 DIAGNOSIS — D64.9 ANEMIA, UNSPECIFIED TYPE: ICD-10-CM

## 2020-02-10 DIAGNOSIS — R52 PAIN: Primary | ICD-10-CM

## 2020-02-10 LAB
ABO GROUP BLD: NORMAL
ABO GROUP BLD: NORMAL
BLD GP AB SCN SERPL QL: NEGATIVE
EXT PREGNANCY TEST URINE: NEGATIVE
EXT. CONTROL: NORMAL
RH BLD: POSITIVE
RH BLD: POSITIVE
SPECIMEN EXPIRATION DATE: NORMAL

## 2020-02-10 PROCEDURE — 86850 RBC ANTIBODY SCREEN: CPT | Performed by: OBSTETRICS & GYNECOLOGY

## 2020-02-10 PROCEDURE — 81025 URINE PREGNANCY TEST: CPT | Performed by: OBSTETRICS & GYNECOLOGY

## 2020-02-10 PROCEDURE — 99024 POSTOP FOLLOW-UP VISIT: CPT | Performed by: OBSTETRICS & GYNECOLOGY

## 2020-02-10 PROCEDURE — 88307 TISSUE EXAM BY PATHOLOGIST: CPT | Performed by: PATHOLOGY

## 2020-02-10 PROCEDURE — 86901 BLOOD TYPING SEROLOGIC RH(D): CPT | Performed by: OBSTETRICS & GYNECOLOGY

## 2020-02-10 PROCEDURE — 86900 BLOOD TYPING SEROLOGIC ABO: CPT | Performed by: OBSTETRICS & GYNECOLOGY

## 2020-02-10 PROCEDURE — 58542 LSH W/T/O UT 250 G OR LESS: CPT | Performed by: OBSTETRICS & GYNECOLOGY

## 2020-02-10 RX ORDER — OXYCODONE HYDROCHLORIDE AND ACETAMINOPHEN 5; 325 MG/1; MG/1
2 TABLET ORAL EVERY 6 HOURS PRN
Status: DISCONTINUED | OUTPATIENT
Start: 2020-02-10 | End: 2020-02-10 | Stop reason: HOSPADM

## 2020-02-10 RX ORDER — SODIUM CHLORIDE 9 MG/ML
125 INJECTION, SOLUTION INTRAVENOUS CONTINUOUS
Status: DISCONTINUED | OUTPATIENT
Start: 2020-02-10 | End: 2020-02-10 | Stop reason: CLARIF

## 2020-02-10 RX ORDER — OXYCODONE HYDROCHLORIDE AND ACETAMINOPHEN 5; 325 MG/1; MG/1
1 TABLET ORAL EVERY 4 HOURS PRN
Status: DISCONTINUED | OUTPATIENT
Start: 2020-02-10 | End: 2020-02-10 | Stop reason: HOSPADM

## 2020-02-10 RX ORDER — SODIUM CHLORIDE 9 MG/ML
INJECTION, SOLUTION INTRAVENOUS CONTINUOUS PRN
Status: DISCONTINUED | OUTPATIENT
Start: 2020-02-10 | End: 2020-02-10 | Stop reason: SURG

## 2020-02-10 RX ORDER — DEXAMETHASONE SODIUM PHOSPHATE 4 MG/ML
INJECTION, SOLUTION INTRA-ARTICULAR; INTRALESIONAL; INTRAMUSCULAR; INTRAVENOUS; SOFT TISSUE AS NEEDED
Status: DISCONTINUED | OUTPATIENT
Start: 2020-02-10 | End: 2020-02-10 | Stop reason: SURG

## 2020-02-10 RX ORDER — DEXMEDETOMIDINE HYDROCHLORIDE 100 UG/ML
INJECTION, SOLUTION INTRAVENOUS AS NEEDED
Status: DISCONTINUED | OUTPATIENT
Start: 2020-02-10 | End: 2020-02-10 | Stop reason: SURG

## 2020-02-10 RX ORDER — ACETAMINOPHEN 325 MG/1
650 TABLET ORAL EVERY 6 HOURS PRN
Status: DISCONTINUED | OUTPATIENT
Start: 2020-02-10 | End: 2020-02-10 | Stop reason: HOSPADM

## 2020-02-10 RX ORDER — FENTANYL CITRATE 50 UG/ML
INJECTION, SOLUTION INTRAMUSCULAR; INTRAVENOUS AS NEEDED
Status: DISCONTINUED | OUTPATIENT
Start: 2020-02-10 | End: 2020-02-10 | Stop reason: SURG

## 2020-02-10 RX ORDER — FENTANYL CITRATE/PF 50 MCG/ML
50 SYRINGE (ML) INJECTION
Status: DISCONTINUED | OUTPATIENT
Start: 2020-02-10 | End: 2020-02-10 | Stop reason: HOSPADM

## 2020-02-10 RX ORDER — ONDANSETRON 2 MG/ML
4 INJECTION INTRAMUSCULAR; INTRAVENOUS EVERY 6 HOURS PRN
Status: DISCONTINUED | OUTPATIENT
Start: 2020-02-10 | End: 2020-02-10 | Stop reason: HOSPADM

## 2020-02-10 RX ORDER — HYDROMORPHONE HCL/PF 1 MG/ML
0.5 SYRINGE (ML) INJECTION
Status: DISCONTINUED | OUTPATIENT
Start: 2020-02-10 | End: 2020-02-10 | Stop reason: HOSPADM

## 2020-02-10 RX ORDER — KETOROLAC TROMETHAMINE 30 MG/ML
INJECTION, SOLUTION INTRAMUSCULAR; INTRAVENOUS AS NEEDED
Status: DISCONTINUED | OUTPATIENT
Start: 2020-02-10 | End: 2020-02-10 | Stop reason: SURG

## 2020-02-10 RX ORDER — MEPERIDINE HYDROCHLORIDE 50 MG/ML
12.5 INJECTION INTRAMUSCULAR; INTRAVENOUS; SUBCUTANEOUS ONCE AS NEEDED
Status: DISCONTINUED | OUTPATIENT
Start: 2020-02-10 | End: 2020-02-10 | Stop reason: HOSPADM

## 2020-02-10 RX ORDER — OXYCODONE HYDROCHLORIDE AND ACETAMINOPHEN 5; 325 MG/1; MG/1
1 TABLET ORAL EVERY 4 HOURS PRN
Qty: 20 TABLET | Refills: 0 | Status: SHIPPED | OUTPATIENT
Start: 2020-02-10 | End: 2020-02-20

## 2020-02-10 RX ORDER — MIDAZOLAM HYDROCHLORIDE 2 MG/2ML
INJECTION, SOLUTION INTRAMUSCULAR; INTRAVENOUS AS NEEDED
Status: DISCONTINUED | OUTPATIENT
Start: 2020-02-10 | End: 2020-02-10 | Stop reason: SURG

## 2020-02-10 RX ORDER — GLYCOPYRROLATE 0.2 MG/ML
INJECTION INTRAMUSCULAR; INTRAVENOUS AS NEEDED
Status: DISCONTINUED | OUTPATIENT
Start: 2020-02-10 | End: 2020-02-10 | Stop reason: SURG

## 2020-02-10 RX ORDER — SODIUM CHLORIDE 9 MG/ML
125 INJECTION, SOLUTION INTRAVENOUS CONTINUOUS
Status: DISCONTINUED | OUTPATIENT
Start: 2020-02-10 | End: 2020-02-10 | Stop reason: HOSPADM

## 2020-02-10 RX ORDER — NEOSTIGMINE METHYLSULFATE 1 MG/ML
INJECTION INTRAVENOUS AS NEEDED
Status: DISCONTINUED | OUTPATIENT
Start: 2020-02-10 | End: 2020-02-10 | Stop reason: SURG

## 2020-02-10 RX ORDER — PROPOFOL 10 MG/ML
INJECTION, EMULSION INTRAVENOUS AS NEEDED
Status: DISCONTINUED | OUTPATIENT
Start: 2020-02-10 | End: 2020-02-10 | Stop reason: SURG

## 2020-02-10 RX ORDER — HYDROMORPHONE HCL/PF 1 MG/ML
SYRINGE (ML) INJECTION AS NEEDED
Status: DISCONTINUED | OUTPATIENT
Start: 2020-02-10 | End: 2020-02-10 | Stop reason: SURG

## 2020-02-10 RX ORDER — MAGNESIUM HYDROXIDE 1200 MG/15ML
LIQUID ORAL AS NEEDED
Status: DISCONTINUED | OUTPATIENT
Start: 2020-02-10 | End: 2020-02-10 | Stop reason: HOSPADM

## 2020-02-10 RX ORDER — ONDANSETRON 2 MG/ML
4 INJECTION INTRAMUSCULAR; INTRAVENOUS ONCE AS NEEDED
Status: DISCONTINUED | OUTPATIENT
Start: 2020-02-10 | End: 2020-02-10 | Stop reason: HOSPADM

## 2020-02-10 RX ORDER — ROCURONIUM BROMIDE 10 MG/ML
INJECTION, SOLUTION INTRAVENOUS AS NEEDED
Status: DISCONTINUED | OUTPATIENT
Start: 2020-02-10 | End: 2020-02-10 | Stop reason: SURG

## 2020-02-10 RX ORDER — ONDANSETRON 2 MG/ML
INJECTION INTRAMUSCULAR; INTRAVENOUS AS NEEDED
Status: DISCONTINUED | OUTPATIENT
Start: 2020-02-10 | End: 2020-02-10 | Stop reason: SURG

## 2020-02-10 RX ADMIN — HYDROMORPHONE HYDROCHLORIDE 0.5 MG: 1 INJECTION, SOLUTION INTRAMUSCULAR; INTRAVENOUS; SUBCUTANEOUS at 12:24

## 2020-02-10 RX ADMIN — MIDAZOLAM 2 MG: 1 INJECTION INTRAMUSCULAR; INTRAVENOUS at 11:16

## 2020-02-10 RX ADMIN — DEXMEDETOMIDINE HYDROCHLORIDE 8 MCG: 100 INJECTION, SOLUTION INTRAVENOUS at 12:52

## 2020-02-10 RX ADMIN — DEXMEDETOMIDINE HYDROCHLORIDE 4 MCG: 100 INJECTION, SOLUTION INTRAVENOUS at 12:59

## 2020-02-10 RX ADMIN — PROPOFOL 200 MG: 10 INJECTION, EMULSION INTRAVENOUS at 11:21

## 2020-02-10 RX ADMIN — ONDANSETRON 4 MG: 2 INJECTION INTRAMUSCULAR; INTRAVENOUS at 12:36

## 2020-02-10 RX ADMIN — Medication 2000 MG: at 11:12

## 2020-02-10 RX ADMIN — DEXAMETHASONE SODIUM PHOSPHATE 4 MG: 4 INJECTION, SOLUTION INTRAMUSCULAR; INTRAVENOUS at 11:36

## 2020-02-10 RX ADMIN — FENTANYL CITRATE 50 MCG: 50 INJECTION, SOLUTION INTRAMUSCULAR; INTRAVENOUS at 13:33

## 2020-02-10 RX ADMIN — SODIUM CHLORIDE 125 ML/HR: 0.9 INJECTION, SOLUTION INTRAVENOUS at 15:39

## 2020-02-10 RX ADMIN — ROCURONIUM BROMIDE 10 MG: 50 INJECTION, SOLUTION INTRAVENOUS at 12:24

## 2020-02-10 RX ADMIN — HYDROMORPHONE HYDROCHLORIDE 0.5 MG: 1 INJECTION, SOLUTION INTRAMUSCULAR; INTRAVENOUS; SUBCUTANEOUS at 12:47

## 2020-02-10 RX ADMIN — FENTANYL CITRATE 100 MCG: 50 INJECTION, SOLUTION INTRAMUSCULAR; INTRAVENOUS at 11:21

## 2020-02-10 RX ADMIN — ROCURONIUM BROMIDE 50 MG: 50 INJECTION, SOLUTION INTRAVENOUS at 11:21

## 2020-02-10 RX ADMIN — FENTANYL CITRATE 50 MCG: 50 INJECTION, SOLUTION INTRAMUSCULAR; INTRAVENOUS at 11:38

## 2020-02-10 RX ADMIN — GLYCOPYRROLATE 0.6 MG: 0.2 INJECTION INTRAMUSCULAR; INTRAVENOUS at 12:40

## 2020-02-10 RX ADMIN — SODIUM CHLORIDE 125 ML/HR: 0.9 INJECTION, SOLUTION INTRAVENOUS at 09:32

## 2020-02-10 RX ADMIN — OXYCODONE HYDROCHLORIDE AND ACETAMINOPHEN 1 TABLET: 5; 325 TABLET ORAL at 14:57

## 2020-02-10 RX ADMIN — DEXMEDETOMIDINE HYDROCHLORIDE 8 MCG: 100 INJECTION, SOLUTION INTRAVENOUS at 12:34

## 2020-02-10 RX ADMIN — FENTANYL CITRATE 50 MCG: 50 INJECTION, SOLUTION INTRAMUSCULAR; INTRAVENOUS at 11:42

## 2020-02-10 RX ADMIN — NEOSTIGMINE METHYLSULFATE 3 MG: 1 INJECTION, SOLUTION INTRAVENOUS at 12:40

## 2020-02-10 RX ADMIN — FENTANYL CITRATE 50 MCG: 50 INJECTION, SOLUTION INTRAMUSCULAR; INTRAVENOUS at 13:17

## 2020-02-10 RX ADMIN — SODIUM CHLORIDE: 0.9 INJECTION, SOLUTION INTRAVENOUS at 11:25

## 2020-02-10 RX ADMIN — KETOROLAC TROMETHAMINE 30 MG: 30 INJECTION, SOLUTION INTRAMUSCULAR at 12:36

## 2020-02-10 NOTE — H&P
Assessment/Plan    Diagnoses and all orders for this visit:     Menorrhagia with regular cycle  -     Tissue Exam     Fibroids     Adenomyosis      Discussed options including medical management versus surgical management  We discussed hysteroscopic myomectomy with or without endometrial ablation versus hysterectomy  We discussed supracervical versus total hysterectomy both with bilateral salpingectomy  We discussed the risks of the surgery including but not limited to infection, hemorrhage, bowel, bladder, vascular injury, possible necessity for laparotomy      Patient is opted proceed with an [facility] BS  Subjective:       Patient ID: Theodore Godwin is a 40 y o  female      HPI  P3 referred to office by JEANNINE Quevedo/ Dr Shahram Blue secondary to menorrhagia and anemia  Patient states that over the past year she has been experiencing extremely heavy menses with passage of large clots  This is associated with dysmenorrhea and lower back pain and increasing pelvic pressure  Recent hemoglobin was 8 8  Patient had an ultrasound which showed 3 fibroids the largest measuring 3 21 7 and 2 9 cm  The 2 9 cm suggested either cervical or submucosal fibroid  The ultrasound also suggested adenomyosis  Patient has had a prior tubal     The following portions of the patient's history were reviewed and updated as appropriate:   She  has a past medical history of Anemia, Fibroid uterus, Hypertension, Hypertension, and Psychiatric disorder  She        Patient Active Problem List     Diagnosis Date Noted    Ankle fracture 08/19/2019    Closed trimalleolar fracture of left ankle with routine healing 08/08/2019      She  has a past surgical history that includes Tubal ligation  Her family history includes Diabetes in her brother; Hypertension in her father and mother  She  reports that she has never smoked  She has never used smokeless tobacco  She reports that she drinks alcohol  She reports that she does not use drugs           Current Outpatient Medications   Medication Sig Dispense Refill    buPROPion (WELLBUTRIN SR) 200 MG 12 hr tablet Take 200 mg by mouth daily        gabapentin (NEURONTIN) 300 mg capsule Take 300 mg by mouth daily at bedtime        hydrochlorothiazide (HYDRODIURIL) 12 5 mg tablet Take 12 5 mg by mouth daily        lisinopril (ZESTRIL) 10 mg tablet Take 10 mg by mouth daily        loratadine (CLARITIN) 10 mg tablet Take 10 mg by mouth daily        traMADol (ULTRAM) 50 mg tablet Take 50 mg by mouth every 6 (six) hours as needed          No current facility-administered medications for this visit             Current Outpatient Medications on File Prior to Visit   Medication Sig    buPROPion (WELLBUTRIN SR) 200 MG 12 hr tablet Take 200 mg by mouth daily    gabapentin (NEURONTIN) 300 mg capsule Take 300 mg by mouth daily at bedtime    hydrochlorothiazide (HYDRODIURIL) 12 5 mg tablet Take 12 5 mg by mouth daily    lisinopril (ZESTRIL) 10 mg tablet Take 10 mg by mouth daily    loratadine (CLARITIN) 10 mg tablet Take 10 mg by mouth daily    traMADol (ULTRAM) 50 mg tablet Take 50 mg by mouth every 6 (six) hours as needed      No current facility-administered medications on file prior to visit        She has No Known Allergies        Review of Systems   Constitutional: Negative  HENT: Negative for sore throat and trouble swallowing  Respiratory: Negative  Cardiovascular: Negative  Gastrointestinal: Negative  Genitourinary: Positive for menstrual problem and pelvic pain  Negative for difficulty urinating, dyspareunia, enuresis, frequency, genital sores, hematuria and urgency           Objective:        /80   Pulse 97   Ht 5' 10" (1 778 m)   Wt 93 4 kg (206 lb)   LMP 12/26/2019   BMI 29 56 kg/m²             Physical Exam   Constitutional: She appears well-nourished  Neck: Normal range of motion  Neck supple  No thyromegaly present     Cardiovascular: Normal rate, regular rhythm and normal heart sounds  Pulmonary/Chest: Effort normal and breath sounds normal  No respiratory distress  Abdominal: Soft  Bowel sounds are normal  She exhibits no distension and no mass  There is no tenderness  There is no rebound and no guarding  No hernia  Hernia confirmed negative in the right inguinal area and confirmed negative in the left inguinal area  Genitourinary: There is no rash, tenderness or lesion on the right labia  There is no rash, tenderness or lesion on the left labia  Uterus is enlarged  Uterus is not deviated, not fixed and not tender  Cervix exhibits no motion tenderness, no discharge and no friability  Right adnexum displays no mass, no tenderness and no fullness  Left adnexum displays no mass, no tenderness and no fullness  No erythema, tenderness or bleeding in the vagina  No vaginal discharge found  Lymphadenopathy:     She has no cervical adenopathy  No inguinal adenopathy noted on the right or left side           Endometrial biopsy   benign

## 2020-02-10 NOTE — ANESTHESIA PREPROCEDURE EVALUATION
Review of Systems/Medical History          Cardiovascular  Hypertension ,    Pulmonary  Negative pulmonary ROS        GI/Hepatic    GERD well controlled,             Endo/Other  Negative endo/other ROS      GYN       Hematology   Musculoskeletal       Neurology    Headaches,    Psychology   Psychiatric history,              Physical Exam    Airway    Mallampati score: I  TM Distance: >3 FB  Neck ROM: full     Dental   No notable dental hx     Cardiovascular  Rhythm: regular, Rate: normal,     Pulmonary  Pulmonary exam normal     Other Findings        Anesthesia Plan  ASA Score- 2     Anesthesia Type- general with ASA Monitors  Additional Monitors:   Airway Plan: ETT  Plan Factors-    Induction- intravenous  Postoperative Plan-     Informed Consent- Anesthetic plan and risks discussed with patient

## 2020-02-10 NOTE — OP NOTE
OPERATIVE REPORT  PATIENT NAME: Kota Castillo    :  1975  MRN: [id number]  Pt Location: [facility] OR ROOM 01    SURGERY DATE: 2/10/2020    Surgeon(s) and Role:     Amara Cartwright,  - Primary     * Erika Pitts MD - Fellow, Donta Arenas MD - Resident    Preop Diagnosis:  Uterine leiomyoma, unspecified location [D25 9]  Anemia, unspecified type [D64 9]  Excessive or frequent menstruation [N92 0]    Post-Op Diagnosis Codes:     * Uterine leiomyoma, unspecified location [D25 9]     * Anemia, unspecified type [D64 9]     * Excessive or frequent menstruation [N92 0]    Procedure(s) (LRB):  L S H  W/REMOVAL OF B/LTUBES WITH MYOMECTOMY (N/A)    Specimen(s):  ID Type Source Tests Collected by Time Destination   1 : Uterus, Fibroid, and B/L Tubes Tissue Uterus TISSUE Allegra Greco DO 2/10/2020 1201        Estimated Blood Loss:   250 mL    Drains:  [REMOVED] Urethral Catheter Non-latex 16 Fr  (Removed)   Number of days: 0       Anesthesia Type:   General    Operative Indications:  Uterine leiomyoma, unspecified location [D25 9]  Anemia, unspecified type [D64 9]  Excessive or frequent menstruation [N92 0]    Operative Findings:  1  Enlarged multifibroid uterus, with approximated 4 cm prolapsing fibroid removed vaginally prior to uterine manipulator insertion  2  Normal appearing bilateral fallopian tubes and ovaries  3  No pelvic adhesive disease  4  Normal appearing liver edge  Complications:   None    Procedure and Technique:    Appropriate preoperative antibiotics chosen per ACOG guidelines were given  Bilateral SCDs were placed in the lower extremities for DVT prevention prior to the institution of anesthesia  No bladder, ureteral, viscus, or solid organ injury were noted at the end of the procedure  The patient was identified in the holding area by the operating room staff and attending physician   She was taken to the operating room where anesthesia was instituted without complications  She was placed in the dorsal lithotomy position with the legs in [id number] [facility] with care taken to avoid excessive flexion or extension of her lower extremities  The patient was prepped and draped in the usual sterile fashion  A Gonzales catheter was inserted  Sterile speculum was placed into the vagina and the above findings and were noted  Given the fact that the fibroid was delivering it was grasped easily, and removed with gentle rotation  Minimal bleeding was noted  The Maria Esther manipulator tip was then easily placed without a colpotomy cup  An allis placed on the cervix due to the dilation from the prolapsing fibroid  Next, the deepest part of the umbilicus was grasped and everted with an Allis clamp  The edges of the umbilicus were grasped with 2 towel clamps to elevate the abdominal wall away from the abdominal organs and vessels  A Veress needle was gently inserted into the umbilicus at a 45 degree angle  Once entry into the abdominal cavity was confirmed, the abdomen was insufflated with CO2 gas to 15 mmHg  Next, a 10 mm diagnostic laparoscope was inserted via direct entry into the umbilicus  The patient was then placed in Trendelenburg for better visualization of the pelvis  Next, a 5 mm trocar was inserted into the left lateral quadrant under direct visualization  Then a 5 mm trocar was inserted into the right lateral quadrant under direct visualization  First, a survey of the cavity was performed, and we confirmed the above-mentioned findings  We started a salpingectomy on the right side with the Ligasure device  The salpingectomy was performed by transecting the mesosalpinx of the right fallopian tube to the mesosalpinx to the level of the cornua  The right utero-ovarian ligament was coagulated and transected  The contralateral tube was treated in the same manner      Next, the round ligament was opened on the right side, and the anterior leaf of the broad ligament was dissected toward the level of the internal cervical os to develop the bladder flap  The left round ligament was then entered and the utero-ovarian ligament coagulated and transected  The bladder flap was completed from the left size and the uterine arteries were skeletonized, coagulated and transected  The right uterine arteries were coagulated and transected  Next, the bipolar Sapphire loop was introduced and the loop tightened at the level of the cervical canal  Once the loop was cleared of bowel and other major structures, the loop was activated and the corpus of the uterus was amputated without difficulty  Next, a 2 5-cm incision was created at the level of the suprapubic area in the midline in a horizontal fashion  To introduce the Bin retractor  A 15mm specimen bag was placed into the abdomen through this minilaparotomy incision  Next, the Gelpoint cover was placed over the retractor to maintain pneumoperitoneum  Next, the specimen was placed into the bag and the tail of the bag pulled through the suprapubic incision  The edges were rolled down and it was confirmed that the specimen was safely in the bag  The specimen was morcellated manually without difficulty with the scalpel  The bag was then removed once the specimen was removed  The Bin retractor was removed  The fascia was closed with 0 Vicryl suture in a running stitch  The skin was closed with 4-0 Monocryl in a subcuticular fashion       A final laparoscopic survey of the pelvic cavity and we confirmed good hemostasis  The ureters were seen vermiculating normally bilaterally  The trocars were removed  The gas was allowed to escape  The skin incisions were closed with plain interrupted sutures without complications  The Gonzales was removed  The patient tolerated the procedure well  The sponge, needle and instrument count were correct x 2  The patient tolerated the procedure well   She was awakened from anesthesia and transferred to the recovery room in stable condition  Dr Mariama So was present for the entire procedure        Patient Disposition:  PACU     SIGNATURE: Michele Camara MD  DATE: February 10, 2020  TIME: 12:55 PM

## 2020-02-10 NOTE — ANESTHESIA POSTPROCEDURE EVALUATION
Post-Op Assessment Note    CV Status:  Stable  Pain Score: 1    Pain management: adequate     Mental Status:  Alert and awake   Hydration Status:  Euvolemic   PONV Controlled:  Controlled   Airway Patency:  Patent   Post Op Vitals Reviewed: Yes      Staff: Anesthesiologist           /58 (02/10/20 1303)    Temp 97 8 °F (36 6 °C) (02/10/20 1303)    Pulse 70 (02/10/20 1303)   Resp 16 (02/10/20 1303)    SpO2 93 % (02/10/20 1303)

## 2020-02-10 NOTE — DISCHARGE INSTRUCTIONS
Laparoscopic Hysterectomy   WHAT YOU SHOULD KNOW:   Laparoscopic hysterectomy [facility]) is surgery to remove your uterus only (partial hysterectomy), or your uterus and cervix (total hysterectomy)  Other organs, such as your ovaries and fallopian tubes, may also be removed  AFTER YOU LEAVE:   Medicines:   · Medicines  may be given to decrease pain or to treat or prevent a bacterial infection  Ask your primary healthcare provider [facility]) how to take prescription pain medicine safely  You may also need to take hormone medicine, such as estrogen  · Take your medicine as directed  Contact your PHP if you think your medicine is not helping or if you have side effects  Tell him if you are allergic to any medicine  Keep a list of the medicines, vitamins, and herbs you take  Include the amounts, and when and why you take them  Bring the list or the pill bottles to follow-up visits  Carry your medicine list with you in case of an emergency  Activity guidelines: You may feel like resting more after surgery  Slowly start to do more each day  Rest when you feel it is needed  Ask when you can return to your usual activities  What to expect after surgery:   · Ask your gynecologist or PHP about routine tests that you will need  · It is normal to have some bleeding from your vagina after certain types of hysterectomy surgery  Ask your gynecologist or PHP if you should expect bleeding, and how much bleeding to expect  Contact your gynecologist or PHP if:   · You have a fever  · You have pain that gets worse or does not decrease or go away with treatment  · You notice pus or a foul-smelling drainage coming from an incision, or it is red or swollen  · You have new or more bright red bleeding from your vagina or your incisions  · You have yellow, green, or foul-smelling discharge coming from your vagina  · You have trouble urinating, burning when you urinate, or feel a need to urinate often      · You have trouble having a bowel movement  · Your skin is itchy, swollen, or has a rash  · You have questions or concerns about your condition or care  Seek care immediately or call 911 if:   · You are bleeding from your vagina, or bleeding more than you were told to expect  · Your arm or leg feels warm, tender, and painful  It may look swollen and red  · You feel lightheaded, short of breath, and have chest pain  · You cough up blood  © 2014 [address] is for End User's use only and may not be sold, redistributed or otherwise used for commercial purposes  All illustrations and images included in CareNotes® are the copyrighted property of A D A M , Inc  or Carlos Kelly  The above information is an  only  It is not intended as medical advice for individual conditions or treatments  Talk to your doctor, nurse or pharmacist before following any medical regimen to see if it is safe and effective for you

## 2020-02-14 ENCOUNTER — DOCUMENTATION (OUTPATIENT)
Dept: GYNECOLOGY | Facility: CLINIC | Age: 45
End: 2020-02-14

## 2020-02-14 NOTE — PROGRESS NOTES
Called pt post op [facility] doing well but has a cold with a cough  Advised Delsym to stop cough  No fever, just stuffy and cough   Incision look fine moving bowels still using pain meds  Especially at night to help her sleep  appt made for 2 week appt

## 2020-02-25 ENCOUNTER — OFFICE VISIT (OUTPATIENT)
Dept: GYNECOLOGY | Facility: CLINIC | Age: 45
End: 2020-02-25

## 2020-02-25 VITALS
BODY MASS INDEX: 29.92 KG/M2 | DIASTOLIC BLOOD PRESSURE: 100 MMHG | HEART RATE: 96 BPM | HEIGHT: 70 IN | SYSTOLIC BLOOD PRESSURE: 142 MMHG | WEIGHT: 209 LBS

## 2020-02-25 DIAGNOSIS — Z48.89 POSTOPERATIVE VISIT: Primary | ICD-10-CM

## 2020-02-25 PROCEDURE — 99024 POSTOP FOLLOW-UP VISIT: CPT | Performed by: OBSTETRICS & GYNECOLOGY

## 2020-02-25 NOTE — PROGRESS NOTES
Patient presents for postoperative check; she is status post [facility] on February 10th  She also had a myomectomy done at the time of that surgery as there was a prolapsed fibroid through the cervical os  Final pathology from the surgery revealed leiomyomata and adenomyosis ; uterine weight was 233 g  She is doing well since the surgery with no complaints  Physical exam:  Abdominal port sites are healing well  No erythema no exudate      Impression:  Postoperative check    Plan:  Return to Dr Zain Barbosa office p r n /annual

## 2020-02-25 NOTE — LETTER
February 25, 2020     Lara Regla, [address] [place] [address]    Patient: Dinorah Coon   YOB: 1975   Date of Visit: 2/25/2020       Dear Elver Carty,    Thank you for referring Dinorah Coon to me for evaluation  Below are my notes for this consultation  If you have questions, please do not hesitate to call me  I look forward to following your patient along with you  Sincerely,        Marilee Lopez DO        CC: MD Marilee Gallegos DO  2/25/2020  3:09 PM  Incomplete  Patient presents for postoperative check she is status post [facility] on February 10th  She also had a myomectomy done at the time of that surgery as there was a prolapsed fibroid through the cervical os  Final pathology from the surgery revealed leiomyomata and adenomyosis ; uterine weight was 233 g  She is doing well since the surgery with no complaints  Physical exam:  Abdominal port sites are healing well  No erythema no exudate      Impression:  Postoperative check    Plan:  Return to Dr Mac Donohue office p r n /annual

## 2021-05-05 ENCOUNTER — ANNUAL EXAM (OUTPATIENT)
Dept: GYNECOLOGY | Facility: CLINIC | Age: 46
End: 2021-05-05
Payer: MEDICARE

## 2021-05-05 VITALS
BODY MASS INDEX: 29.92 KG/M2 | HEART RATE: 77 BPM | WEIGHT: 209 LBS | DIASTOLIC BLOOD PRESSURE: 74 MMHG | HEIGHT: 70 IN | SYSTOLIC BLOOD PRESSURE: 118 MMHG

## 2021-05-05 DIAGNOSIS — N36.41 URETHRAL HYPERMOBILITY: ICD-10-CM

## 2021-05-05 DIAGNOSIS — Z12.31 ENCOUNTER FOR SCREENING MAMMOGRAM FOR BREAST CANCER: Primary | ICD-10-CM

## 2021-05-05 DIAGNOSIS — N39.3 SUI (STRESS URINARY INCONTINENCE, FEMALE): ICD-10-CM

## 2021-05-05 DIAGNOSIS — R32 URINARY INCONTINENCE, UNSPECIFIED TYPE: Primary | ICD-10-CM

## 2021-05-05 DIAGNOSIS — Z01.419 ENCOUNTER FOR GYNECOLOGICAL EXAMINATION WITHOUT ABNORMAL FINDING: Primary | ICD-10-CM

## 2021-05-05 DIAGNOSIS — N81.89 PELVIC FLOOR WEAKNESS: ICD-10-CM

## 2021-05-05 DIAGNOSIS — Z01.419 ENCOUNTER FOR GYNECOLOGICAL EXAMINATION WITH PAPANICOLAOU SMEAR OF CERVIX: Primary | ICD-10-CM

## 2021-05-05 PROCEDURE — G0145 SCR C/V CYTO,THINLAYER,RESCR: HCPCS | Performed by: OBSTETRICS & GYNECOLOGY

## 2021-05-05 PROCEDURE — G0101 CA SCREEN;PELVIC/BREAST EXAM: HCPCS | Performed by: OBSTETRICS & GYNECOLOGY

## 2021-05-05 RX ORDER — HYDROCHLOROTHIAZIDE 25 MG/1
25 TABLET ORAL DAILY
COMMUNITY
Start: 2021-03-01

## 2021-05-05 NOTE — PROGRESS NOTES
Assessment/Plan:         Diagnoses and all orders for this visit:    Encounter for gynecological examination without abnormal finding    TRUDY (stress urinary incontinence, female); discussed the pelvic floor PT  Also discussed surgical correction  Urethral hypermobility    Other orders  -     hydrochlorothiazide (HYDRODIURIL) 25 mg tablet; Take 25 mg by mouth daily        Subjective:      Patient ID: Vicente Dallas is a 39 y o  female  HPI P3 for annual exam   She is status post [facility] in February of 2020  She denies any vaginal irritation, burning, discharge, or bleeding  She does present complaining of stress urinary incontinence and weak pelvic floor muscles  Denies any urgency or urgency incontinence She she denies any GI complaints  The following portions of the patient's history were reviewed and updated as appropriate:   She  has a past medical history of Abnormal uterine bleeding, Anemia, Anxiety, Depression, Fibroid uterus, GERD (gastroesophageal reflux disease), Hard of hearing, Headache, Hypertension, Hypertension, Psychiatric disorder, and Skin abnormality  She   Patient Active Problem List    Diagnosis Date Noted    Uterine leiomyoma 01/23/2020    Anemia 01/23/2020    Excessive or frequent menstruation 01/23/2020    Ankle fracture 08/19/2019    Closed trimalleolar fracture of left ankle with routine healing 08/08/2019     She  has a past surgical history that includes Tubal ligation; Fracture surgery (Left); and pr lap, supracervial hysterectomy w/ tube&ov, <250g (N/A, 2/10/2020)  Her family history includes Diabetes in her brother; Hypertension in her father and mother  She  reports that she has never smoked  She has never used smokeless tobacco  She reports previous alcohol use  She reports current drug use  Frequency: 5 00 times per week  Drug: Marijuana    Current Outpatient Medications   Medication Sig Dispense Refill    buPROPion (WELLBUTRIN SR) 200 MG 12 hr tablet Take 200 mg by mouth daily      hydrochlorothiazide (HYDRODIURIL) 25 mg tablet Take 25 mg by mouth daily      lisinopril (ZESTRIL) 10 mg tablet Take 10 mg by mouth daily      loratadine (CLARITIN) 10 mg tablet Take 10 mg by mouth daily      Magnesium 250 MG TABS Take 1 tablet by mouth daily      Multiple Vitamins-Minerals (MULTIVITAMIN WOMEN PO) Take 1 tablet by mouth daily      zinc gluconate 50 mg tablet Take 50 mg by mouth daily       No current facility-administered medications for this visit  Current Outpatient Medications on File Prior to Visit   Medication Sig    buPROPion (WELLBUTRIN SR) 200 MG 12 hr tablet Take 200 mg by mouth daily    hydrochlorothiazide (HYDRODIURIL) 25 mg tablet Take 25 mg by mouth daily    lisinopril (ZESTRIL) 10 mg tablet Take 10 mg by mouth daily    loratadine (CLARITIN) 10 mg tablet Take 10 mg by mouth daily    Magnesium 250 MG TABS Take 1 tablet by mouth daily    Multiple Vitamins-Minerals (MULTIVITAMIN WOMEN PO) Take 1 tablet by mouth daily    zinc gluconate 50 mg tablet Take 50 mg by mouth daily    [DISCONTINUED] acetaminophen (TYLENOL) 325 mg tablet Take 650 mg by mouth every 6 (six) hours as needed for mild pain    [DISCONTINUED] ferrous sulfate 325 (65 Fe) mg tablet Take 325 mg by mouth daily with breakfast    [DISCONTINUED] gabapentin (NEURONTIN) 300 mg capsule Take 300 mg by mouth daily at bedtime    [DISCONTINUED] hydrochlorothiazide (HYDRODIURIL) 12 5 mg tablet Take 12 5 mg by mouth daily    [DISCONTINUED] ibuprofen (MOTRIN) 200 mg tablet Take by mouth every 6 (six) hours as needed for mild pain     No current facility-administered medications on file prior to visit  She has No Known Allergies       Review of Systems   Constitutional: Negative  HENT: Negative for sore throat and trouble swallowing  Gastrointestinal: Negative  Genitourinary: Positive for enuresis   Negative for difficulty urinating, dyspareunia, dysuria, frequency, hematuria, pelvic pain, urgency and vaginal discharge  Objective:      /74 (BP Location: Left arm, Patient Position: Sitting, Cuff Size: Standard)   Pulse 77   Ht 5' 10" (1 778 m)   Wt 94 8 kg (209 lb)   LMP 01/16/2020 (Exact Date) Comment: urine HCG negative  BMI 29 99 kg/m²          Physical Exam  Vitals signs reviewed  Constitutional:       Appearance: Normal appearance  Neck:      Musculoskeletal: Normal range of motion and neck supple  No muscular tenderness  Cardiovascular:      Rate and Rhythm: Normal rate and regular rhythm  Pulses: Normal pulses  Heart sounds: Normal heart sounds  No murmur  Pulmonary:      Effort: Pulmonary effort is normal  No respiratory distress  Breath sounds: Normal breath sounds  Abdominal:      General: There is no distension  Palpations: Abdomen is soft  There is no mass  Tenderness: There is no abdominal tenderness  There is no guarding or rebound  Hernia: No hernia is present  There is no hernia in the right inguinal area  Genitourinary:     General: Normal vulva  Labia:         Right: No rash, tenderness or lesion  Left: No rash, tenderness or lesion  Comments: Urethra hyper mobile  Uterus surgically absent  Cervix appears normal   Vagina normal   No palpable adnexal masses or tenderness  External  Lymphadenopathy:      Cervical: No cervical adenopathy  Lower Body: No right inguinal adenopathy  Neurological:      Mental Status: She is alert

## 2021-05-11 LAB
LAB AP GYN PRIMARY INTERPRETATION: NORMAL
Lab: NORMAL

## 2021-05-21 ENCOUNTER — LAB REQUISITION (OUTPATIENT)
Dept: LAB | Facility: HOSPITAL | Age: 46
End: 2021-05-21
Payer: MEDICARE

## 2021-05-21 ENCOUNTER — DOCUMENTATION (OUTPATIENT)
Dept: GYNECOLOGY | Facility: CLINIC | Age: 46
End: 2021-05-21

## 2021-05-21 DIAGNOSIS — I10 ESSENTIAL (PRIMARY) HYPERTENSION: ICD-10-CM

## 2021-05-21 DIAGNOSIS — Z11.59 ENCOUNTER FOR SCREENING FOR OTHER VIRAL DISEASES: ICD-10-CM

## 2021-05-21 DIAGNOSIS — Z11.4 ENCOUNTER FOR SCREENING FOR HUMAN IMMUNODEFICIENCY VIRUS (HIV): ICD-10-CM

## 2021-05-21 LAB
ALBUMIN SERPL BCP-MCNC: 3.7 G/DL (ref 3.5–5)
ALP SERPL-CCNC: 71 U/L (ref 46–116)
ALT SERPL W P-5'-P-CCNC: 25 U/L (ref 12–78)
ANION GAP SERPL CALCULATED.3IONS-SCNC: 8 MMOL/L (ref 4–13)
AST SERPL W P-5'-P-CCNC: 10 U/L (ref 5–45)
BASOPHILS # BLD AUTO: 0.03 THOUSANDS/ΜL (ref 0–0.1)
BASOPHILS NFR BLD AUTO: 1 % (ref 0–1)
BILIRUB SERPL-MCNC: 0.6 MG/DL (ref 0.2–1)
BUN SERPL-MCNC: 12 MG/DL (ref 5–25)
CALCIUM SERPL-MCNC: 9.2 MG/DL (ref 8.3–10.1)
CHLORIDE SERPL-SCNC: 104 MMOL/L (ref 100–108)
CHOLEST SERPL-MCNC: 176 MG/DL (ref 50–200)
CO2 SERPL-SCNC: 26 MMOL/L (ref 21–32)
CREAT SERPL-MCNC: 0.72 MG/DL (ref 0.6–1.3)
EOSINOPHIL # BLD AUTO: 0.07 THOUSAND/ΜL (ref 0–0.61)
EOSINOPHIL NFR BLD AUTO: 1 % (ref 0–6)
ERYTHROCYTE [DISTWIDTH] IN BLOOD BY AUTOMATED COUNT: 13.6 % (ref 11.6–15.1)
GFR SERPL CREATININE-BSD FRML MDRD: 101 ML/MIN/1.73SQ M
GLUCOSE SERPL-MCNC: 76 MG/DL (ref 65–140)
HCT VFR BLD AUTO: 40.6 % (ref 34.8–46.1)
HCV AB SER QL: NORMAL
HDLC SERPL-MCNC: 46 MG/DL
HGB BLD-MCNC: 13 G/DL (ref 11.5–15.4)
IMM GRANULOCYTES # BLD AUTO: 0.02 THOUSAND/UL (ref 0–0.2)
IMM GRANULOCYTES NFR BLD AUTO: 0 % (ref 0–2)
LDLC SERPL CALC-MCNC: 110 MG/DL (ref 0–100)
LYMPHOCYTES # BLD AUTO: 1.59 THOUSANDS/ΜL (ref 0.6–4.47)
LYMPHOCYTES NFR BLD AUTO: 28 % (ref 14–44)
MCH RBC QN AUTO: 30 PG (ref 26.8–34.3)
MCHC RBC AUTO-ENTMCNC: 32 G/DL (ref 31.4–37.4)
MCV RBC AUTO: 94 FL (ref 82–98)
MONOCYTES # BLD AUTO: 0.29 THOUSAND/ΜL (ref 0.17–1.22)
MONOCYTES NFR BLD AUTO: 5 % (ref 4–12)
NEUTROPHILS # BLD AUTO: 3.69 THOUSANDS/ΜL (ref 1.85–7.62)
NEUTS SEG NFR BLD AUTO: 65 % (ref 43–75)
NONHDLC SERPL-MCNC: 130 MG/DL
NRBC BLD AUTO-RTO: 0 /100 WBCS
PLATELET # BLD AUTO: 253 THOUSANDS/UL (ref 149–390)
PMV BLD AUTO: 11.2 FL (ref 8.9–12.7)
POTASSIUM SERPL-SCNC: 4 MMOL/L (ref 3.5–5.3)
PROT SERPL-MCNC: 6.7 G/DL (ref 6.4–8.2)
RBC # BLD AUTO: 4.33 MILLION/UL (ref 3.81–5.12)
SODIUM SERPL-SCNC: 138 MMOL/L (ref 136–145)
TRIGL SERPL-MCNC: 100 MG/DL
TSH SERPL DL<=0.05 MIU/L-ACNC: 1.62 UIU/ML (ref 0.36–3.74)
WBC # BLD AUTO: 5.69 THOUSAND/UL (ref 4.31–10.16)

## 2021-05-21 PROCEDURE — 85025 COMPLETE CBC W/AUTO DIFF WBC: CPT | Performed by: NURSE PRACTITIONER

## 2021-05-21 PROCEDURE — 86803 HEPATITIS C AB TEST: CPT | Performed by: NURSE PRACTITIONER

## 2021-05-21 PROCEDURE — 80053 COMPREHEN METABOLIC PANEL: CPT | Performed by: NURSE PRACTITIONER

## 2021-05-21 PROCEDURE — 84443 ASSAY THYROID STIM HORMONE: CPT | Performed by: NURSE PRACTITIONER

## 2021-05-21 PROCEDURE — 80061 LIPID PANEL: CPT | Performed by: NURSE PRACTITIONER

## 2021-05-21 PROCEDURE — 87389 HIV-1 AG W/HIV-1&-2 AB AG IA: CPT | Performed by: NURSE PRACTITIONER

## 2021-05-23 LAB — HIV 1+2 AB+HIV1 P24 AG SERPL QL IA: NORMAL

## 2021-07-06 ENCOUNTER — EVALUATION (OUTPATIENT)
Dept: PHYSICAL THERAPY | Facility: REHABILITATION | Age: 46
End: 2021-07-06
Payer: MEDICARE

## 2021-07-06 DIAGNOSIS — M62.89 PELVIC FLOOR DYSFUNCTION: Primary | ICD-10-CM

## 2021-07-06 DIAGNOSIS — N39.46 MIXED STRESS AND URGE URINARY INCONTINENCE: ICD-10-CM

## 2021-07-06 PROCEDURE — 97162 PT EVAL MOD COMPLEX 30 MIN: CPT | Performed by: PHYSICAL THERAPIST

## 2021-07-06 PROCEDURE — 97112 NEUROMUSCULAR REEDUCATION: CPT | Performed by: PHYSICAL THERAPIST

## 2021-07-06 NOTE — PROGRESS NOTES
PT Evaluation     Today's date: 2021  Patient name: Sharan Santoyo  : 1975  MRN: [id number]  Referring provider: Hamilton Fox DO  Dx:   Encounter Diagnosis     ICD-10-CM    1  Pelvic floor dysfunction  M62 89    2  Mixed stress and urge urinary incontinence  N39 46                   Assessment  Assessment details: Ms Dagoberto Hirsch is a 38 yo  with complaints of mixed urinary incontinence and difficulty with defecation  She presents with mild pelvic floor muscle weakness, moderate abdominal muscle weakness, and abnormal coordination/management of intraabdominal pressures (abdominal binder, abdominal gripping) contributing to symptoms  She also has moderate vaginal wall laxity, despite good PFM tone  She would benefit from PT including PFM and abdominopelvic muscle strengthening, urinary voiding behavioral retraining, and pressure management strategies  Symptom irritability: moderateUnderstanding of Dx/Px/POC: good   Prognosis: good    Goals  1  No Urge UI in 12 weeks  2  Daytime urinary void interval of 2-3 hours in 8 weeks with at least 50 oz water intake  3  Straining or vaginal splinting <50% of the time with defecation    Plan  Planned therapy interventions: abdominal trunk stabilization, manual therapy, patient education, breathing training, body mechanics training, strengthening, therapeutic activities, therapeutic exercise and home exercise program  Frequency: 1x week  Duration in weeks: 12  Plan of Care beginning date: 2021  Plan of Care expiration date: 2021  Treatment plan discussed with: patient        PT Pelvic Floor Subjective:   History of Present Illness:   Primary complaints: Urinary incontinence and pelvic floor muscle weakness  Stress UI with coughing, laughing, bending  Also reports urge urinary incontinence occurring approx 2x/day  UI volume is moderate- wears incontinence liner, changes 1-2 times per day  Daytime void interval is 1-2 hours  Drinks 16 oz bottles x3  Longstanding constipation (2 years) in which she splints posterior vaginal wall 75% and strains 50% of the time  BM 2x/day and soft solid stool, although small amounts  Has external hemorrhoids that are intermittent  Feels vaginal bulge posteriorly when straining/wiping  Patient is - all vaginal deliveries, mild perineal laceration with first childbirth (at age 12)  Not currently sexually active; some history of discomfort with intercourse (deep)  S/p lap hysterectomy by Dr Lisa Ramirez in 2020 with dx of uterine fibroids (leiomyoma and adenomyosis)  Not currently working  Not currently exercising-- is 2 years out from trimalleolar fracture and surgery  Reports 30lb weight gain during this 2 year period  Walks everywhere as she does not drive  Social Support:     Lives with:  Darcy Dennis children    Relationship status: never     Work status: unemployed  Diet and Exercise:    Diet:balanced nutrition    Exercise type: no activity  OB/ gyn History    Gestational History:     Prior Pregnancy: Yes      Number of prior pregnancies: 3    Number of term pregnancies: 3  Pain:     No pain reported by patient  Treatments:     None    Patient Goals:     Patient goals for therapy:  Improved bladder or bowel function and improved quality of life      Objective   Pelvic Floor Exam   Abdominal assessment: nontender nondistended; wearing an abdominal binders 8 hr/day for 5 days/wk  Diastatis   Diastasis recti present? no  Connective tissue integrity at linea alba: firm  no tenderness at linea alba  unable to engage transverse abdominis (oblique overactivity with breath holding)     Skin inspection:   no scars present  General Perineum Exam:   perineum intact  Positive for hemorrhoids     Negative for swelling and perianal erythema    Visual Inspection of Perineum:   Excursion of perineal body in cephalad direction with contraction of pelvic floor muscles (PFM): good  Excursion of perineal body in caudal direction with relaxation of pelvic floor muscles (PFM): good   Involuntary contraction with coughing: yes  Involuntary relaxation with bearing down: yes  Cotton swab test: non-tender  Sensation: intact    Pelvic Organ Prolapse   Position: hook-lying  At rest: mild and mild  With bearing down: moderate (to the level of hymenal remnants)  Location: anterior and posterior  Comments: Mild urethral laxity; mild rectocele; moderate vaginal wall tissue laxity; low resting position of perineal body; fair to good PFM tone  Pelvic Floor Muscle Exam     Palpation   No increased muscle tension in the bulbospongiosus, ischiocavernosus, super transverse perineal, puborectalis, pubococcygeus, iIliococcygeus and periurethral  No tenderness on right in the bulbospongiosus, ischiocavernosus, super transverse perineal, puborectalis, pubococcygeus, iliococcygeus and periurethral  No tenderness on left in the bulbospongiosus, ischiocavernosus, super transverse perineal, puborectalis, pubococcygeus, iliococcygeus and periurethral    Muscle Contraction: well isolated  Breathing pattern with contraction: within normal limits  Pelvic floor muscle relaxation is complete       PERFECT Score   Power right: 4/5  Power left: 4/5  Endurance (seconds to max): 10  Repetitions (before fatigue): 3  Fast flicks (in 10 seconds): 8      Flowsheet Rows      Most Recent Value   PT/OT G-Codes   Current Score  25   Projected Score  0           Precautions: standard      Manuals 7/6            PFM exam                                                    Neuro Re-Ed             PF+TA nv            Urinary urge deferral discussed; handout provided                                                                             Ther Ex             TA strengthening progression             Functional PFM strengthening             Aerobic warm up                                                                              Ther Activity             Voiding diaries provided to be completed            Bladder retraining             Education: PF anatomy and function 15'

## 2021-07-20 ENCOUNTER — OFFICE VISIT (OUTPATIENT)
Dept: PHYSICAL THERAPY | Facility: REHABILITATION | Age: 46
End: 2021-07-20
Payer: MEDICARE

## 2021-07-20 DIAGNOSIS — M62.89 PELVIC FLOOR DYSFUNCTION: Primary | ICD-10-CM

## 2021-07-20 DIAGNOSIS — N39.46 MIXED STRESS AND URGE URINARY INCONTINENCE: ICD-10-CM

## 2021-07-20 PROCEDURE — 97112 NEUROMUSCULAR REEDUCATION: CPT | Performed by: PHYSICAL THERAPIST

## 2021-07-20 PROCEDURE — 97110 THERAPEUTIC EXERCISES: CPT | Performed by: PHYSICAL THERAPIST

## 2021-07-20 NOTE — PROGRESS NOTES
Daily Note     Today's date: 2021  Patient name: Kenia Harris  : 1975  MRN: [id number]  Referring provider: Irina Marcial DO  Dx:   Encounter Diagnosis     ICD-10-CM    1  Pelvic floor dysfunction  M62 89    2  Mixed stress and urge urinary incontinence  N39 46                   Subjective: Continues to wear abdominal binder when she leaves the home to make her stomach skinny  Is starting time-restricted eating (eating window 12p-8p)  No overall change to her symptoms  Completed the voiding diaries but did not bring today  Objective: See treatment diary below  Assessment: Tolerated treatment well  Patient demonstrates newly learned breath excursion and coordination with core strengthening  Able to isolate PFM and TrA without substitutions in supine and quadruped positions  She is receptive to training regarding abdominal binder and abdominal gripping tendencies  She would benefit from continued PT to progress strengthening  Plan: Continue per plan of care        Precautions: standard    Manuals            PFM exam 15'                                                   Neuro Re-Ed             PF+TA nv Supine, quadruped           Urinary urge deferral discussed; handout provided                                                                             Ther Ex             Stationary bike  6 min level 10           TA strengthening progression  Supine pillow squeeze +TA x30  Supine bridge+ exhale x30  Quad TA+PF x30  Quad hydrant+TA x10 ea  Quad leg ext+TA x10 ea           Functional PFM strengthening                                                                              Ther Activity             Voiding diaries provided to be completed            Bladder retraining             Education: PF anatomy and function 15'

## 2021-07-27 ENCOUNTER — OFFICE VISIT (OUTPATIENT)
Dept: PHYSICAL THERAPY | Facility: REHABILITATION | Age: 46
End: 2021-07-27
Payer: MEDICARE

## 2021-07-27 DIAGNOSIS — N39.46 MIXED STRESS AND URGE URINARY INCONTINENCE: ICD-10-CM

## 2021-07-27 DIAGNOSIS — M62.89 PELVIC FLOOR DYSFUNCTION: Primary | ICD-10-CM

## 2021-07-27 PROCEDURE — 97140 MANUAL THERAPY 1/> REGIONS: CPT

## 2021-07-27 PROCEDURE — 97110 THERAPEUTIC EXERCISES: CPT

## 2021-07-27 PROCEDURE — 97112 NEUROMUSCULAR REEDUCATION: CPT

## 2021-07-27 PROCEDURE — 97530 THERAPEUTIC ACTIVITIES: CPT

## 2021-07-27 NOTE — PROGRESS NOTES
Daily Note     Today's date: 2021  Patient name: Whitney Mark  : 1975  MRN: [id number]  Referring provider: Claudetta Pauling, DO  Dx:   Encounter Diagnosis     ICD-10-CM    1  Pelvic floor dysfunction  M62 89    2  Mixed stress and urge urinary incontinence  N39 46        Start Time: 1505  Stop Time: 1600  Total time in clinic (min): 55 minutes   Objective: See treatment diary below  Feels her bowels have improved since starting intermittent fasting but she continue to have issues  HEP: bowel massage  Handout on squatty potty and recommendation of Come as you are book  Assessment: Tolerated treatment well  Pt demonstrates good understanding of reviewed material   Responds well to addition of reformer to coordinate breath with activation of PFM/TA  Plan: Continue per plan of care  Review urge suppression technique       Precautions: standard    Manuals           PFM exam 15'            Bowel massage   10'                                    Neuro Re-Ed             PF+TA nv Supine, quadruped           Urinary urge deferral discussed; handout provided            Reformer   20' with focus on coordination of breath and muscle activation                                                              Ther Ex             Stationary bike  6 min level 10 10' level 5          TA strengthening progression  Supine pillow squeeze +TA x30  Supine bridge+ exhale x30  Quad TA+PF x30  Quad hydrant+TA x10 ea  Quad leg ext+TA x10 ea           Functional PFM strengthening                                                                              Ther Activity             Voiding diaries provided to be completed  Forgot to bring them          Bladder retraining    **         Education: PF anatomy and function 15'   15'

## 2021-08-02 NOTE — PROGRESS NOTES
Daily Note     Today's date: 2021  Patient name: Hector Swartz  : 1975  MRN: [id number]  Referring provider: Donavon Helm DO  Dx:   No diagnosis found  Objective: See treatment diary below  Feels her bowels have improved since starting intermittent fasting but she continue to have issues  HEP: bowel massage  Handout on squatty potty and recommendation of Come as you are book  Assessment: Tolerated treatment well  Pt demonstrates good understanding of reviewed material   Responds well to addition of reformer to coordinate breath with activation of PFM/TA  Plan: Continue per plan of care  Review urge suppression technique       Precautions: standard    Manuals           PFM exam 15'            Bowel massage   10'                                    Neuro Re-Ed             PF+TA nv Supine, quadruped           Urinary urge deferral discussed; handout provided            Reformer   20' with focus on coordination of breath and muscle activation                                                              Ther Ex             Stationary bike  6 min level 10 10' level 5          TA strengthening progression  Supine pillow squeeze +TA x30  Supine bridge+ exhale x30  Quad TA+PF x30  Quad hydrant+TA x10 ea  Quad leg ext+TA x10 ea           Functional PFM strengthening                                                                              Ther Activity             Voiding diaries provided to be completed  Forgot to bring them          Bladder retraining    **         Education: PF anatomy and function 15'   15'

## 2021-08-03 ENCOUNTER — APPOINTMENT (OUTPATIENT)
Dept: PHYSICAL THERAPY | Facility: REHABILITATION | Age: 46
End: 2021-08-03
Payer: MEDICARE

## 2021-08-04 ENCOUNTER — OFFICE VISIT (OUTPATIENT)
Dept: PHYSICAL THERAPY | Facility: REHABILITATION | Age: 46
End: 2021-08-04
Payer: MEDICARE

## 2021-08-04 DIAGNOSIS — N39.46 MIXED STRESS AND URGE URINARY INCONTINENCE: ICD-10-CM

## 2021-08-04 DIAGNOSIS — M62.89 PELVIC FLOOR DYSFUNCTION: Primary | ICD-10-CM

## 2021-08-04 PROCEDURE — 97140 MANUAL THERAPY 1/> REGIONS: CPT

## 2021-08-04 PROCEDURE — 97110 THERAPEUTIC EXERCISES: CPT

## 2021-08-04 PROCEDURE — 97112 NEUROMUSCULAR REEDUCATION: CPT

## 2021-08-04 NOTE — PROGRESS NOTES
Daily Note     Today's date: 2021  Patient name: Alessandro Neff  : 1975  MRN: [id number]  Referring provider: Evelio Husain DO  Dx:   Encounter Diagnosis     ICD-10-CM    1  Pelvic floor dysfunction  M62 89    2  Mixed stress and urge urinary incontinence  N39 46        Start Time: 1000Stop Time: 1045  Total time in clinic (min): 45 minutes   Subjective: Pt reports she is attempting to increase her water intake  Admits to not being consistent with her HEP and continues to feels she "should suck it in"  Feels her bowels have improved, "but I feel they are not always complete, I need to wipe a lot"  Pt has ordered a stool to improve her bowel posture  Pt is now drinking 4 water bottles a day and sometimes a ginger ale at night  Denies any episodes of nocturia  Objective: See treatment diary below  Access Code: [email or web address]  URL: [email or web address]  Date: 2021  Prepared by: Emelia Pascual    Program Notes   With the sit to stand: take a nice deep breath, on the exhale stand up as you are engaging your pelvic floor muscles and lower abdominals  Exercises  Sit to Stand without Arm Support - 1 x daily - 7 x weekly - 2 sets - 10 reps  Standing Bilateral Low Shoulder Row with Anchored Resistance - 1 x daily - 7 x weekly - 2 sets - 10 reps  Diaphragmatic Breathing in Child's Pose with Pelvic Floor Relaxation - 1 x daily - 7 x weekly - 3 sets - 10 reps      Assessment: Tolerated treatment well  Reviewed urge suppression technique, importance of proper hydration and being compliant with HEP and attendance to maximize gains  Added some exercises today but limited with progression due to still being challenged with breath coordination  Plan: Continue per plan of care        Precautions: standard    Manuals          PFM exam 15'            Bowel massage   10' 10'                                   Neuro Re-Ed             PF+TA nv Supine, quadruped  & breath 10'         Urinary urge deferral discussed; handout provided            Reformer   21' with focus on coordination of breath and muscle activation                                                              Ther Ex             Stationary bike  6 min level 10 10' level 5 L5 10'         TA strengthening progression  Supine pillow squeeze +TA x30  Supine bridge+ exhale x30  Quad TA+PF x30  Quad hydrant+TA x10 ea  Quad leg ext+TA x10 ea  Supine pillow squeeze +TA x30  Supine bridge+ exhale x30  Quad TA+PF x30  Quad hydrant+TA x10 ea  Quad leg ext+TA x10 ea            Functional PFM strengthening    Sit to stand 10x  Shoulder rows 2x10 RCO                                                                          Ther Activity             Voiding diaries provided to be completed  Forgot to bring them          Bladder retraining    10'         Education: PF anatomy and function 15'  15'

## 2021-08-12 ENCOUNTER — OFFICE VISIT (OUTPATIENT)
Dept: PHYSICAL THERAPY | Facility: REHABILITATION | Age: 46
End: 2021-08-12
Payer: MEDICARE

## 2021-08-12 DIAGNOSIS — M62.89 PELVIC FLOOR DYSFUNCTION: Primary | ICD-10-CM

## 2021-08-12 DIAGNOSIS — N39.46 MIXED STRESS AND URGE URINARY INCONTINENCE: ICD-10-CM

## 2021-08-12 PROCEDURE — 97112 NEUROMUSCULAR REEDUCATION: CPT | Performed by: PHYSICAL THERAPIST

## 2021-08-12 PROCEDURE — 97110 THERAPEUTIC EXERCISES: CPT | Performed by: PHYSICAL THERAPIST

## 2021-08-12 NOTE — PROGRESS NOTES
Daily Note     Today's date: 2021  Patient name: Kenia Harris  : 1975  MRN: [id number]  Referring provider: Irina Marcial DO  Dx:   Encounter Diagnosis     ICD-10-CM    1  Pelvic floor dysfunction  M62 89    2  Mixed stress and urge urinary incontinence  N39 46        Start Time: 1500  Stop Time: 1555  Total time in clinic (min): 55 minutes    Subjective: Only wore the abdominal  once this week, when she went out for a social event  Performing HEP once per day  Drinking 4 bottles of water consistently, sometimes more  Urge UI only occurred once this week  Objective: See treatment diary below    Assessment: Tolerated treatment well  Patient exhibited good technique with therapeutic exercises    Plan: Continue per plan of care        Precautions: standard    Manuals         PFM exam 15'            Bowel massage   10' 10'                                   Neuro Re-Ed             PF+TA nv Supine, quadruped  & breath 10'         Urinary urge deferral discussed; handout provided            Reformer   20' with focus on coordination of breath and muscle activation  nv                                                            Ther Ex             Stationary bike  6 min level 10 10' level 5 L5 10' L5 10'        TA strengthening progression  Supine pillow squeeze +TA x30  Supine bridge+ exhale x30  Quad TA+PF x30  Quad hydrant+TA x10 ea  Quad leg ext+TA x10 ea  Supine pillow squeeze +TA x30  Supine bridge+ exhale x30  Quad TA+PF x30  Quad hydrant+TA x10 ea  Quad leg ext+TA x10 ea Supine pillow squeeze +TA x30  Supine bridge+ exhale x30  Quad TA+PF x30  Quad hydrant+TA x10 ea  Quad leg ext+TA x10 ea Bridge x20 VIKAS x10 ea Uni    Supine TA+PF x20    Sidelying TA+PF x20    Quad TA+PF 2x20         Functional PFM strengthening    Sit to stand 10x  Shoulder rows 2x10 RCO  Hip hinge to kneeling plank x20                                                                        Ther Activity             Voiding diaries provided to be completed  Forgot to bring them          Bladder retraining    10'         Education: PF anatomy and function 15'  15'

## 2021-08-19 ENCOUNTER — APPOINTMENT (OUTPATIENT)
Dept: PHYSICAL THERAPY | Facility: REHABILITATION | Age: 46
End: 2021-08-19
Payer: MEDICARE

## 2021-08-23 ENCOUNTER — APPOINTMENT (OUTPATIENT)
Dept: PHYSICAL THERAPY | Facility: REHABILITATION | Age: 46
End: 2021-08-23
Payer: MEDICARE

## 2021-08-26 ENCOUNTER — OFFICE VISIT (OUTPATIENT)
Dept: PHYSICAL THERAPY | Facility: REHABILITATION | Age: 46
End: 2021-08-26
Payer: MEDICARE

## 2021-08-26 DIAGNOSIS — N39.46 MIXED STRESS AND URGE URINARY INCONTINENCE: ICD-10-CM

## 2021-08-26 DIAGNOSIS — M62.89 PELVIC FLOOR DYSFUNCTION: Primary | ICD-10-CM

## 2021-08-26 PROCEDURE — 97110 THERAPEUTIC EXERCISES: CPT | Performed by: PHYSICAL THERAPIST

## 2021-08-26 PROCEDURE — 97112 NEUROMUSCULAR REEDUCATION: CPT | Performed by: PHYSICAL THERAPIST

## 2021-08-26 NOTE — PROGRESS NOTES
Daily Note     Today's date: 2021  Patient name: Whitney Mark  : 1975  MRN: [id number]  Referring provider: Claudetta Pauling, DO  Dx:   Encounter Diagnosis     ICD-10-CM    1  Pelvic floor dysfunction  M62 89    2  Mixed stress and urge urinary incontinence  N39 46                   Subjective: Patient was sick with a chest cold last week-- lots of coughing with more UI than usual     Objective: See treatment diary below    Assessment: Tolerated treatment well  Patient exhibited good technique with therapeutic exercises, with improved abdominopelvic coordination and endurance  Plan: Continue per plan of care        Precautions: standard    Manuals        PFM exam 15'            Bowel massage   10' 10'                                   Neuro Re-Ed             PF+TA nv Supine, quadruped  & breath 10'  Quad        Urinary urge deferral discussed; handout provided            Reformer   20' with focus on coordination of breath and muscle activation   nv **                                                          Ther Ex             Stationary bike  6 min level 10 10' level 5 L5 10' L5 10' L7 10'       TA strengthening progression  Supine pillow squeeze +TA x30  Supine bridge+ exhale x30  Quad TA+PF x30  Quad hydrant+TA x10 ea  Quad leg ext+TA x10 ea  Supine pillow squeeze +TA x30  Supine bridge+ exhale x30  Quad TA+PF x30  Quad hydrant+TA x10 ea  Quad leg ext+TA x10 ea Supine pillow squeeze +TA x30  Supine bridge+ exhale x30  Quad TA+PF x30  Quad hydrant+TA x10 ea  Quad leg ext+TA x10 ea Supine add squeeze x20    Supine abd w ring x20    Quad bear crawl 5" x10    Double leg lift from hooklying x20       Functional PFM strengthening    Sit to stand 10x  Shoulder rows 2x10 RCO  Leg press x30    Leg press w LPD 10# x30                                                                            Ther Activity             Voiding diaries provided to be completed  Forgot to bring them Bladder retraining    10'         Education: PF anatomy and function 13'  15'

## 2021-09-02 ENCOUNTER — APPOINTMENT (OUTPATIENT)
Dept: PHYSICAL THERAPY | Facility: REHABILITATION | Age: 46
End: 2021-09-02
Payer: MEDICARE

## 2021-09-08 ENCOUNTER — OFFICE VISIT (OUTPATIENT)
Dept: PHYSICAL THERAPY | Facility: REHABILITATION | Age: 46
End: 2021-09-08
Payer: MEDICARE

## 2021-09-08 DIAGNOSIS — N39.46 MIXED STRESS AND URGE URINARY INCONTINENCE: ICD-10-CM

## 2021-09-08 DIAGNOSIS — M62.89 PELVIC FLOOR DYSFUNCTION: Primary | ICD-10-CM

## 2021-09-08 PROCEDURE — 97110 THERAPEUTIC EXERCISES: CPT | Performed by: PHYSICAL THERAPIST

## 2021-09-08 PROCEDURE — 97112 NEUROMUSCULAR REEDUCATION: CPT | Performed by: PHYSICAL THERAPIST

## 2021-09-08 NOTE — PROGRESS NOTES
Daily Note     Today's date: 2021  Patient name: Alessandro Neff  : 1975  MRN: [id number]  Referring provider: Evelio Husain DO  Dx:   Encounter Diagnosis     ICD-10-CM    1  Pelvic floor dysfunction  M62 89    2  Mixed stress and urge urinary incontinence  N39 46                 Subjective: Less UI because less coughing  Objective: See treatment diary below    Assessment: Tolerated treatment well  Patient exhibited good technique with therapeutic exercises  On the reformer for added strengthening  Notable improvement in the amount of frequency of cueing for breath and TA coordination, however cueing for slow speed and intentional neuromotor coordination is required to ensure consistent TA coordination especially as patient fatigued  Plan: Continue per plan of care        Precautions: standard    Manuals     PFM exam 15'          Bowel massage   10' 10'                             Neuro Re-Ed           PF+TA nv Supine, quadruped  & breath 10'  Quad      Urinary urge deferral discussed; handout provided          Reformer   20' with focus on coordination of breath and muscle activation   nv Leg press (2red) VIKAS x20  Uni (2 red) x20 ea    wheelbarrow (blue) x20 ea direction    100s (red) 2x100    Chest press tall kneeling (blue) x20 w TA cueing                                                    Ther Ex           Stationary bike  6 min level 10 10' level 5 L5 10' L5 10' L7 10' L7 x10 min    TA strengthening progression  Supine pillow squeeze +TA x30  Supine bridge+ exhale x30  Quad TA+PF x30  Quad hydrant+TA x10 ea  Quad leg ext+TA x10 ea  Supine pillow squeeze +TA x30  Supine bridge+ exhale x30  Quad TA+PF x30  Quad hydrant+TA x10 ea  Quad leg ext+TA x10 ea Supine pillow squeeze +TA x30  Supine bridge+ exhale x30  Quad TA+PF x30  Quad hydrant+TA x10 ea  Quad leg ext+TA x10 ea Supine add squeeze x20    Supine abd w ring x20    Quad bear crawl 5" x10    Double leg lift from hooklying x20  ** progress HEP next visit   Functional PFM strengthening    Sit to stand 10x  Shoulder rows 2x10 RCO  Leg press x30    Leg press w LPD 10# x30                                                                Ther Activity           Voiding diaries provided to be completed  Forgot to bring them        Bladder retraining    10'       Education: PF anatomy and function 15'  15'

## 2021-09-09 ENCOUNTER — APPOINTMENT (OUTPATIENT)
Dept: PHYSICAL THERAPY | Facility: REHABILITATION | Age: 46
End: 2021-09-09
Payer: MEDICARE

## 2021-09-15 ENCOUNTER — OFFICE VISIT (OUTPATIENT)
Dept: PHYSICAL THERAPY | Facility: REHABILITATION | Age: 46
End: 2021-09-15
Payer: MEDICARE

## 2021-09-15 DIAGNOSIS — N39.46 MIXED STRESS AND URGE URINARY INCONTINENCE: ICD-10-CM

## 2021-09-15 DIAGNOSIS — M62.89 PELVIC FLOOR DYSFUNCTION: Primary | ICD-10-CM

## 2021-09-15 PROCEDURE — 97110 THERAPEUTIC EXERCISES: CPT

## 2021-09-15 NOTE — PROGRESS NOTES
Daily Note     Today's date: 9/15/2021  Patient name: Nestor Nuñez  : 1975  MRN: [id number]  Referring provider: Tiffanie Newman DO  Dx:   Encounter Diagnosis     ICD-10-CM    1  Pelvic floor dysfunction  M62 89    2  Mixed stress and urge urinary incontinence  N39 46      Start Time: 1240  Stop Time: 1320  Total time in clinic (min): 40 minutes    Subjective: Pt reports being very pleased at her last session, "I really feel like my core was working"  Objective: See treatment diary below    Assessment: Tolerated treatment well  Patient exhibited good technique with therapeutic exercises  Good form on reformer, able to add some exercises  Pt does not feel strong or steady enough at this time to progress to standing exercises on reformer, may introduce sliders on solid ground next session  Plan: Continue per plan of care  Next visit consider sliders on solid ground and issue new HEP       Precautions: standard    Manuals 7/6 7/20 7/27 8/4 8/12 8/26 9/8 9/15   PFM exam 15'          Bowel massage   10' 10'                             Neuro Re-Ed           PF+TA nv Supine, quadruped  & breath 10'  Quad      Urinary urge deferral discussed; handout provided          Reformer   20' with focus on coordination of breath and muscle activation   nv Leg press (2red) VIKAS x20  Uni (2 red) x20 ea    wheelbarrow (blue) x20 ea direction    100s (red) 2x100    Chest press tall kneeling (blue) x20 w TA cueing     Leg press (2red) VIKAS x20  Uni (2 red) x20 ea    wheelbarrow (blue) x20 ea direction  100s (red) 2x100    Chest press tall kneeling (blue) x20 w  Lower trap 2x10   TA cueing  Adductors both and single 1' each                                               Ther Ex           Stationary bike  6 min level 10 10' level 5 L5 10' L5 10' L7 10' L7 x10 min L7 x 8'   TA strengthening progression  Supine pillow squeeze +TA x30  Supine bridge+ exhale x30  Quad TA+PF x30  Quad hydrant+TA x10 ea  Quad leg ext+TA x10 ea Supine pillow squeeze +TA x30  Supine bridge+ exhale x30  Quad TA+PF x30  Quad hydrant+TA x10 ea  Quad leg ext+TA x10 ea Supine pillow squeeze +TA x30  Supine bridge+ exhale x30  Quad TA+PF x30  Quad hydrant+TA x10 ea  Quad leg ext+TA x10 ea Supine add squeeze x20    Supine abd w ring x20    Quad bear crawl 5" x10    Double leg lift from hooklying x20  ** progress HEP next visit   Functional PFM strengthening    Sit to stand 10x  Shoulder rows 2x10 RCO  Leg press x30    Leg press w LPD 10# x30         Prone press ups        Post reformer 2x10                                               Ther Activity           Voiding diaries provided to be completed  Forgot to bring them        Bladder retraining    10'       Education: PF anatomy and function 15'  15'

## 2021-09-23 ENCOUNTER — OFFICE VISIT (OUTPATIENT)
Dept: PHYSICAL THERAPY | Facility: REHABILITATION | Age: 46
End: 2021-09-23
Payer: MEDICARE

## 2021-09-23 DIAGNOSIS — M62.89 PELVIC FLOOR DYSFUNCTION: Primary | ICD-10-CM

## 2021-09-23 DIAGNOSIS — N39.46 MIXED STRESS AND URGE URINARY INCONTINENCE: ICD-10-CM

## 2021-09-23 PROCEDURE — 97110 THERAPEUTIC EXERCISES: CPT

## 2021-09-23 PROCEDURE — 97112 NEUROMUSCULAR REEDUCATION: CPT

## 2021-09-23 NOTE — PROGRESS NOTES
Daily Note     Today's date: 2021  Patient name: Mahesh Parra  : 1975  MRN: [id number]  Referring provider: Jocelyne Ledesma DO  Dx:   Encounter Diagnosis     ICD-10-CM    1  Pelvic floor dysfunction  M62 89    2  Mixed stress and urge urinary incontinence  N39 46      Start Time: 1510  Stop Time: 1610  Total time in clinic (min): 60 minutes    Subjective: Pt denies any complaints, continues to feel her core is strengthening  Objective: See treatment diary below    Access Code: [email or web address]  URL: [email or web address]  Date: 2021  Prepared by: [address] - 1 x daily - 7 x weekly - 3 sets - 10 reps  Side Plank on Knees - 1 x daily - 7 x weekly - 3 reps - 15 hold  Reverse Lunge on Slider - 1 x daily - 7 x weekly - 3 sets - 10 reps  Lunge Matrix on Slider - 1 x daily - 7 x weekly - 3 sets - 10 reps  Lunge on Slider - 1 x daily - 7 x weekly - 3 sets - 10 reps    Assessment: Tolerated treatment well  Patient exhibited good technique with therapeutic exercises  Pt's breathing pattern with core activation continues to improve  Added slider hip exercises also issued as HEP (plus planks)  Plan: Continue per plan of care  Next visit re-evaluation scheduled        Precautions: standard    Manuals 7/6 7/20 7/27 8/4 8/12 8/26 9/8 9/15 9/23   PFM exam 15'           Bowel massage   10' 10'                                Neuro Re-Ed            PF+TA nv Supine, quadruped  & breath 10'  Quad       Urinary urge deferral discussed; handout provided           Reformer   20' with focus on coordination of breath and muscle activation   nv Leg press (2red) VIKAS x20  Uni (2 red) x20 ea    wheelbarrow (blue) x20 ea direction    100s (red) 2x100    Chest press tall kneeling (blue) x20 w TA cueing     Leg press (2red) VIKAS x20  Uni (2 red) x20 ea    wheelbarrow (blue) x20 ea direction  100s (red) 2x100    Chest press tall kneeling (blue) x20 w  Lower trap 2x10   TA cueing  Adductors both and single 1' each Leg press (2red) VIKAS x20  Uni (2 red) x20 ea    wheelbarrow (blue) x20 ea direction  100s (red) 2x100    Chest press tall kneeling (blue) x20 w  Lower trap 2x10   TA cueing  Adductors both and single 1' each                                                   Ther Ex            Stationary bike  6 min level 10 10' level 5 L5 10' L5 10' L7 10' L7 x10 min L7 x 8' L810'   TA strengthening progression  Supine pillow squeeze +TA x30  Supine bridge+ exhale x30  Quad TA+PF x30  Quad hydrant+TA x10 ea  Quad leg ext+TA x10 ea  Supine pillow squeeze +TA x30  Supine bridge+ exhale x30  Quad TA+PF x30  Quad hydrant+TA x10 ea  Quad leg ext+TA x10 ea Supine pillow squeeze +TA x30  Supine bridge+ exhale x30  Quad TA+PF x30  Quad hydrant+TA x10 ea  Quad leg ext+TA x10 ea Supine add squeeze x20    Supine abd w ring x20    Quad bear crawl 5" x10    Double leg lift from hooklying x20     Front planks 1x20"  Side planks 3x10"   Functional PFM strengthening    Sit to stand 10x  Shoulder rows 2x10 RCO  Leg press x30    Leg press w LPD 10# x30          Prone press ups        Post reformer 2x10    Sliders (reverse, front & lateral)         2x10                                       Ther Activity            Voiding diaries provided to be completed  Forgot to bring them         Bladder retraining    10'        Education: PF anatomy and function 15'  15'

## 2021-09-29 ENCOUNTER — EVALUATION (OUTPATIENT)
Dept: PHYSICAL THERAPY | Facility: REHABILITATION | Age: 46
End: 2021-09-29
Payer: MEDICARE

## 2021-09-29 DIAGNOSIS — M62.89 PELVIC FLOOR DYSFUNCTION: Primary | ICD-10-CM

## 2021-09-29 DIAGNOSIS — N39.46 MIXED STRESS AND URGE URINARY INCONTINENCE: ICD-10-CM

## 2021-09-29 PROCEDURE — 97110 THERAPEUTIC EXERCISES: CPT | Performed by: PHYSICAL THERAPIST

## 2021-09-29 NOTE — PROGRESS NOTES
Discharge/Daily Note     Today's date: 2021  Patient name: Michael Hinton  : 1975  MRN: [id number]  Referring provider: Sylvie Pizarro DO  Dx:   Encounter Diagnosis     ICD-10-CM    1  Pelvic floor dysfunction  M62 89    2  Mixed stress and urge urinary incontinence  N39 46                   Subjective: TRUDY with coughing/sneezing has improved 30%  Urge UI has resolved-- is able to hold without leaking and is no longer rushing to the bathroom  Decreased use of a pantiliner-- not daily, only if coughing from allergies  Posterior vaginal bulge when bearing down  Exercising 20 minutes 4 times per week  Also walking 10 minutes a few days per week  Drinking about 50oz water per day  Objective: See treatment diary below  Assessment: Tolerated treatment well  Patient exhibited good technique with therapeutic exercises, with excellent TA, PF and breath coordination, improved core strength and endurance, and translation to functional movement patterns  She is independent in home exercise program and has achieved PT goals  She is appropriate for discharge at this time  Goals from initial evaluation:  1  No Urge UI in 12 weeks  MET  2  Daytime urinary void interval of 2-3 hours in 8 weeks with at least 50 oz water intake   MET  3  Straining or vaginal splinting <50% of the time with defecation MET    Plan: discharge     Precautions: standard    Manuals 7/6 7/20 7/27 8/4 8/12 8/26 9/8 9/15 9/29   PFM exam 15'           Bowel massage   10' 10'                                Neuro Re-Ed            PF+TA nv Supine, quadruped  & breath 10'  Quad       Urinary urge deferral discussed; handout provided           Reformer   20' with focus on coordination of breath and muscle activation   nv Leg press (2red) VIKAS x20  Uni (2 red) x20 ea    wheelbarrow (blue) x20 ea direction    100s (red) 2x100    Chest press tall kneeling (blue) x20 w TA cueing     Leg press (2red) VIKAS x20  Uni (2 red) x20 ea    wheelbarrow (blue) x20 ea direction  100s (red) 2x100    Chest press tall kneeling (blue) x20 w  Lower trap 2x10   TA cueing  Adductors both and single 1' each                Ther Ex            Stationary bike  6 min level 10 10' level 5 L5 10' L5 10' L7 10' L7 x10 min L7 x 8' Treadmill 10 min 5% incline  UBE 3'/3' L2 5   TA strengthening progression  Supine pillow squeeze +TA x30  Supine bridge+ exhale x30  Quad TA+PF x30  Quad hydrant+TA x10 ea  Quad leg ext+TA x10 ea  Supine pillow squeeze +TA x30  Supine bridge+ exhale x30  Quad TA+PF x30  Quad hydrant+TA x10 ea  Quad leg ext+TA x10 ea Supine pillow squeeze +TA x30  Supine bridge+ exhale x30  Quad TA+PF x30  Quad hydrant+TA x10 ea  Quad leg ext+TA x10 ea Supine add squeeze x20    Supine abd w ring x20    Quad bear crawl 5" x10    Double leg lift from hooklying x20  ** progress HEP next visit With kely ring: supine-- Adductor squeeze, Adductor squeeze+ bridge  UE ring squeeze w sustained bridge; adductor leg squeeze with DL march    Tall kneeling--  Ring squeeze chop/lift overhead    Functional PFM strengthening    Sit to stand 10x  Shoulder rows 2x10 RCO  Leg press x30    Leg press w LPD 10# x30          Prone press ups        Post reformer 2x10                                                    Ther Activity            Voiding diaries provided to be completed  Forgot to bring them         Bladder retraining    10'        Education: PF anatomy and function 15'  15'

## 2022-06-30 ENCOUNTER — TRANSCRIBE ORDERS (OUTPATIENT)
Dept: LAB | Facility: HOSPITAL | Age: 47
End: 2022-06-30

## 2022-06-30 DIAGNOSIS — D50.0 IRON DEFICIENCY ANEMIA SECONDARY TO BLOOD LOSS (CHRONIC): Primary | ICD-10-CM

## 2022-08-18 ENCOUNTER — OFFICE VISIT (OUTPATIENT)
Dept: AUDIOLOGY | Age: 47
End: 2022-08-18
Payer: COMMERCIAL

## 2022-08-18 DIAGNOSIS — H90.3 SENSORY HEARING LOSS, BILATERAL: Primary | ICD-10-CM

## 2022-08-18 PROCEDURE — 92591 HB HEARING AID EXAM BOTH EARS: CPT | Performed by: AUDIOLOGIST-HEARING AID FITTER

## 2022-08-18 PROCEDURE — 92557 COMPREHENSIVE HEARING TEST: CPT | Performed by: AUDIOLOGIST-HEARING AID FITTER

## 2022-08-18 PROCEDURE — 92567 TYMPANOMETRY: CPT | Performed by: AUDIOLOGIST-HEARING AID FITTER

## 2022-08-18 NOTE — PROGRESS NOTES
HEARING EVALUATION    Name:  Carli Perez  :  1975  Age:  55 y o  Date of Evaluation: 22     History: Known Hearing Loss in the left ear only  Reason for visit: Carli Perez is being seen today at the request of Dr Rhonda Loving for an evaluation of hearing  Patient reports she has had a profound hearing loss in her left ear since childhood  No recent colds or ear infections  No tinnitus, aural fullness, or ear pain reported  EVALUATION:    Otoscopic Evaluation:   Right Ear: Clear and healthy ear canal and tympanic membrane   Left Ear: Clear and healthy ear canal and tympanic membrane    Tympanometry:   Right: Type A - normal middle ear pressure and compliance   Left: Type A - normal middle ear pressure and compliance    Audiogram Results:  Pure tone testing revealed a severe sloping to profound sensorineural hearing loss in the  left  ear and normal hearing sensitivity in the right ear  SRT and PTA are in agreement indicating good test reliability  Word recognition scores were excellent in the right ear and unable to be measured in the left ear due to the severity of the hearing loss  *see attached audiogram      RECOMMENDATIONS:  Annual hearing eval, Return to [facility]  for F/U and Copy to Patient/Caregiver    PATIENT EDUCATION:   Discussed results and recommendations with Hiral Hagan is going through [facility] for a CROS system  Questions were addressed and the patient was encouraged to contact our department should concerns arise        Nupur Garcia   Clinical Audiologist

## 2022-08-18 NOTE — PROGRESS NOTES
Hearing Aid Evaluation  3Name:  Charlotte Gonsalves  :  1975  Age:  55 y o  Date of Evaluation: 22     Hearing Aid Evaluation:  Hearing aid styles, technology, and price were discussed with the patient  Possible hearing aid options, programs, and accessories were discussed  Pricing options and technology levels were discussed to determine the appropriate device to recommend  Recommendation/Quote: The first hearing aid recommendation is  [employer] More 2 miniRITE & CROS PX miniRITE-R  See attached quote sheet*    Selection:   The patient selected the [employer] More 2 miniRITE-R & CROS [address] [place] miniRITE-R hearing aids  Level: -   Color:93     size: Right 2-85 / Left 2-85   Dome size: 8mm open bass   Accessories:      OVR paperwork sent to patient's counselor           Nupur Chaves   Clinical Audiologist

## 2022-08-23 NOTE — PROGRESS NOTES
Progress Note    Name:  Sudhakar Goins  :  1975  Age:  55 y o  Date of Evaluation: 22     OVR Po received  Ordered hearing aid confirmation [id number]  Using stock CROS PX to assign to patient       Nupur Luo   Clinical Audiologist

## 2022-08-25 NOTE — PROGRESS NOTES
Progress Note    Name:  Jaqui Bonner  :  1975  Age:  55 y o  Date of Evaluation: 22     Hearing aid arrived  [facility] 2 miniRITE R  #[id number]  Warranty 25  Activated battery - working  As per Shannan's note stock CROS PX will be registered to patient  Tracy Raman to schedule HAP with Nupur Michelle   Clinical Audiologist

## 2022-09-22 ENCOUNTER — OFFICE VISIT (OUTPATIENT)
Dept: AUDIOLOGY | Age: 47
End: 2022-09-22
Payer: COMMERCIAL

## 2022-09-22 DIAGNOSIS — H90.3 SENSORY HEARING LOSS, BILATERAL: Primary | ICD-10-CM

## 2022-09-22 PROCEDURE — V5261 HEARING AID, DIGIT, BIN, BTE: HCPCS | Performed by: AUDIOLOGIST-HEARING AID FITTER

## 2022-09-22 PROCEDURE — V5160 DISPENSING FEE BINAURAL: HCPCS | Performed by: AUDIOLOGIST-HEARING AID FITTER

## 2022-09-22 NOTE — PROGRESS NOTES
Hearing Aid Fitting    Name:  Alejandro Murdock  :  1975  Age:  52 y o  Date of Evaluation: 22     Alejandro Murdock is being see today to be fit with new hearing aids through [facility]  Patient is fit with [employer] More 2 miniRITE-R & CROS PX-R hearing aid(s)  Right serial number [id number]  Left serial number [id number]  Warranty date: 25 (Loss/Damage and repair)  Ear mold Battery  Dome   Right -- R 2-85 6mm OB   Left -- R 2-85 8mm OB     The hearing aid(s) were adjusted based on the patient's most recent audiogram and patient comfort  Patient noted good sound quality, and was happy with the fitting  Care and cleaning of the hearing aids was reviewed  Domes, filters, and batteries and user manual were reviewed with the patient  Insertion and removal of the hearing aids was done  The patient practiced insertion and removal of the devices in the office, they demonstrated excellent ability to manipulate the hearing aids  Telephone use was reviewed with the patient  The patient expressed satisfaction with the hearing aids  Recommendation:   Follow up in two weeks  Ordered CROS PX in chestnut brown to switch out at follow up  OVR paperwork sent to pt's counselor         Nupur Barr   Clinical Audiologist

## 2022-09-26 NOTE — PROGRESS NOTES
Progress Note    Name:  Sudhakar Goins  :  1975  Age:  52 y o  Date of Evaluation: 22     Ordered color change for CROS, received a More 2  Called [employer] to get CROS in chestnut Radhika Pollen, [id number]  Sending More 2 back in for credit       Nupur Luo   Clinical Audiologist

## 2022-09-27 NOTE — PROGRESS NOTES
Progress Note    Name:  Angelito Gee  :  1975  Age:  52 y o  Date of Evaluation: 22     CROS PX miniRITE-R arrived  SN: [id number]  Warranty: 2025    Patient is scheduled on 10/10/2022    Naseem Johansen    Clinical Audiologist

## 2022-10-24 ENCOUNTER — OFFICE VISIT (OUTPATIENT)
Dept: AUDIOLOGY | Age: 47
End: 2022-10-24

## 2022-10-24 DIAGNOSIS — H90.3 SENSORY HEARING LOSS, BILATERAL: Primary | ICD-10-CM

## 2022-10-24 NOTE — PROGRESS NOTES
Hearing Aid Visit:    Name:  Margo Chapman  :  1975  Age:  52 y o  Date of Evaluation: 10/24/22     Margo Chapman is being seen for a hearing aid visit  Patient is fit with Oticon More 2 miniRITE-R & CROS PX-R hearing aid(s)  Right serial number [id number]  Left serial number [id number]  Warranty date: 25 (Loss/Damage and repair)  Patient reports that she is having trouble connecting the CROS with her hearing aid  Action:  A clean and check revealed both devices to be blocked with wax  Reviewed wax guards  Shilo Ambrosio was supposed to receive the CROS in chestnut brown today however the replacement device started flashing in the  indicating a service error  Sending in for repair  Shilo Ambrosio will be called when the replacement is in, at that appointment her CROS in chroma beige will be returned for credit  Recommendations:   Return when CROS is back from repair       Nupur Johnson   Clinical Audiologist

## 2023-01-30 ENCOUNTER — OFFICE VISIT (OUTPATIENT)
Dept: AUDIOLOGY | Age: 48
End: 2023-01-30

## 2023-01-30 DIAGNOSIS — H90.3 SENSORY HEARING LOSS, BILATERAL: Primary | ICD-10-CM

## 2023-01-30 NOTE — PROGRESS NOTES
Hearing Aid Visit:    Name:  Sonia Flores  :  1975  Age:  52 y o  Date of Evaluation: 23     Sonia Flores is being seen for a hearing aid visit  Patient is fit with Oticon More 2 miniRITE-R & CROS PX-R hearing aid(s)  Right serial number [id number]  Left serial number [id number]  Warranty date: 25 (Loss/Damage and repair)  Patient reports her CROS is not working  Action:  A clean and check was performed  The hearing aid was turned off upon inspection and the transmitter was dead  Replaced wax guards which were blocked  Recommended that [address] [facility] [address] charge both devices to 100% and make sure they are working ok       Recommendations:   Return Nupur Soares , CCC-A  Clinical Audiologist

## 2023-06-20 ENCOUNTER — OFFICE VISIT (OUTPATIENT)
Dept: AUDIOLOGY | Age: 48
End: 2023-06-20

## 2023-06-20 DIAGNOSIS — H90.3 SENSORY HEARING LOSS, BILATERAL: Primary | ICD-10-CM

## 2023-06-20 NOTE — PROGRESS NOTES
Hearing Aid Visit:    Name:  Mario aLla  :  1975  Age:  52 y o  Date of Evaluation: 23     Mario Lala is being seen for a hearing aid visit  Patient is fit with Oticon More 2 miniRITE-R & CROS PX-R hearing aid(s)  Right serial [id number]  Left serial [id number]  Warranty date: 25 (Loss/Damage and repair)  Patient reports a broken left  wire and requested more volume in the right ear  Action:  The left  was replaced and the right hearing aid was turned up 3 dB overall  Patient voiced good sound quality  2 packs of domes and 2 packs of wax guards were provided upon request     Recommendations: Follow up Naseem Leyva    Clinical Audiologist

## 2023-07-28 ENCOUNTER — OFFICE VISIT (OUTPATIENT)
Dept: AUDIOLOGY | Age: 48
End: 2023-07-28

## 2023-07-28 DIAGNOSIS — H90.3 SENSORY HEARING LOSS, BILATERAL: Primary | ICD-10-CM

## 2023-07-28 NOTE — PROGRESS NOTES
Hearing Aid Visit:    Name:  Shanthi Burroughs  :  1975  Age:  52 y.o. Date of Evaluation: 23     Shanthi Burroughs is being seen for a hearing aid visit. Patient is fit with Ot[employer] Alaris More 2 miniRITE-R & CROS PX-R hearing aid(s). Right serial number [id number]. Left serial number [id number]. Warranty date: 25 (Loss/Damage and repair). Patient reports hearing aid is not working. Listening check mimics patient complaint. Replaced wax guard and vacuumed microphone ports without improvement. Replaced  from stock. Hearing aid is producing appropriate sound with good sound quality. Verified CROS is transmitting. Patient reports satisfaction. Replacement  will be ordered under warranty. Recommendations:   Return as needed.      Naseem Amador.,  CCC-A  Clinical Audiologist

## 2023-10-11 ENCOUNTER — TELEPHONE (OUTPATIENT)
Age: 48
End: 2023-10-11

## 2024-02-08 ENCOUNTER — OFFICE VISIT (OUTPATIENT)
Dept: AUDIOLOGY | Age: 49
End: 2024-02-08

## 2024-02-08 DIAGNOSIS — H90.3 SENSORY HEARING LOSS, BILATERAL: Primary | ICD-10-CM

## 2024-02-08 NOTE — PROGRESS NOTES
Progress Note    Name:  Hiral Snyder  :  1975  Age:  48 y.o.  MRN:  [id number]  Date of Evaluation: 24     HISTORY:    Patient requested the hearing aids be cleaned. Both are working properly..    Patient is fit with Oticon More 2 miniRITE-R & CROS PX-R hearing aid(s).   Right serial number [id number]. Left serial number [id number].   Warranty date: 25 (Loss/Damage and repair).     ACTION/ADJUSTMENTS:    Hearing aids were cleaned and checked. Domes and wax guards were replaced. Listening check was good.    2 packs of domes and 2 packs of wax guards were provided upon request.    Naseem Evans., [facility]  Clinical Audiologist

## 2024-02-21 ENCOUNTER — APPOINTMENT (OUTPATIENT)
Dept: LAB | Facility: CLINIC | Age: 49
End: 2024-02-21
Payer: MEDICARE

## 2024-02-21 DIAGNOSIS — R53.83 OTHER FATIGUE: ICD-10-CM

## 2024-02-21 DIAGNOSIS — Z00.01 ENCOUNTER FOR ROUTINE ADULT HEALTH EXAMINATION WITH ABNORMAL FINDINGS: ICD-10-CM

## 2024-02-21 LAB
25(OH)D3 SERPL-MCNC: 23.7 NG/ML (ref 30–100)
BASOPHILS # BLD AUTO: 0.02 THOUSANDS/ÂΜL (ref 0–0.1)
BASOPHILS NFR BLD AUTO: 0 % (ref 0–1)
EOSINOPHIL # BLD AUTO: 0.07 THOUSAND/ÂΜL (ref 0–0.61)
EOSINOPHIL NFR BLD AUTO: 1 % (ref 0–6)
ERYTHROCYTE [DISTWIDTH] IN BLOOD BY AUTOMATED COUNT: 12.2 % (ref 11.6–15.1)
FERRITIN SERPL-MCNC: 64 NG/ML (ref 11–307)
FOLATE SERPL-MCNC: 17.6 NG/ML
HCT VFR BLD AUTO: 39.5 % (ref 34.8–46.1)
HGB BLD-MCNC: 13 G/DL (ref 11.5–15.4)
HIV 1+2 AB+HIV1 P24 AG SERPL QL IA: NORMAL
HIV 2 AB SERPL QL IA: NORMAL
HIV1 AB SERPL QL IA: NORMAL
HIV1 P24 AG SERPL QL IA: NORMAL
IMM GRANULOCYTES # BLD AUTO: 0.03 THOUSAND/UL (ref 0–0.2)
IMM GRANULOCYTES NFR BLD AUTO: 1 % (ref 0–2)
LYMPHOCYTES # BLD AUTO: 1.71 THOUSANDS/ÂΜL (ref 0.6–4.47)
LYMPHOCYTES NFR BLD AUTO: 26 % (ref 14–44)
MCH RBC QN AUTO: 30.4 PG (ref 26.8–34.3)
MCHC RBC AUTO-ENTMCNC: 32.9 G/DL (ref 31.4–37.4)
MCV RBC AUTO: 93 FL (ref 82–98)
MONOCYTES # BLD AUTO: 0.44 THOUSAND/ÂΜL (ref 0.17–1.22)
MONOCYTES NFR BLD AUTO: 7 % (ref 4–12)
NEUTROPHILS # BLD AUTO: 4.31 THOUSANDS/ÂΜL (ref 1.85–7.62)
NEUTS SEG NFR BLD AUTO: 65 % (ref 43–75)
NRBC BLD AUTO-RTO: 0 /100 WBCS
PLATELET # BLD AUTO: 285 THOUSANDS/UL (ref 149–390)
PMV BLD AUTO: 10.3 FL (ref 8.9–12.7)
RBC # BLD AUTO: 4.27 MILLION/UL (ref 3.81–5.12)
TSH SERPL DL<=0.05 MIU/L-ACNC: 2.45 UIU/ML (ref 0.45–4.5)
VIT B12 SERPL-MCNC: 1130 PG/ML (ref 180–914)
WBC # BLD AUTO: 6.58 THOUSAND/UL (ref 4.31–10.16)

## 2024-02-21 PROCEDURE — 83550 IRON BINDING TEST: CPT

## 2024-02-21 PROCEDURE — 36415 COLL VENOUS BLD VENIPUNCTURE: CPT

## 2024-02-21 PROCEDURE — 87389 HIV-1 AG W/HIV-1&-2 AB AG IA: CPT

## 2024-02-21 PROCEDURE — 82728 ASSAY OF FERRITIN: CPT

## 2024-02-21 PROCEDURE — 80053 COMPREHEN METABOLIC PANEL: CPT

## 2024-02-21 PROCEDURE — 84443 ASSAY THYROID STIM HORMONE: CPT

## 2024-02-21 PROCEDURE — 85025 COMPLETE CBC W/AUTO DIFF WBC: CPT

## 2024-02-21 PROCEDURE — 82607 VITAMIN B-12: CPT

## 2024-02-21 PROCEDURE — 83540 ASSAY OF IRON: CPT

## 2024-02-21 PROCEDURE — 82746 ASSAY OF FOLIC ACID SERUM: CPT

## 2024-02-21 PROCEDURE — 80061 LIPID PANEL: CPT

## 2024-02-21 PROCEDURE — 82306 VITAMIN D 25 HYDROXY: CPT

## 2024-02-22 LAB
ALBUMIN SERPL BCP-MCNC: 4.3 G/DL (ref 3.5–5)
ALP SERPL-CCNC: 61 U/L (ref 34–104)
ALT SERPL W P-5'-P-CCNC: 14 U/L (ref 7–52)
ANION GAP SERPL CALCULATED.3IONS-SCNC: 10 MMOL/L
AST SERPL W P-5'-P-CCNC: 15 U/L (ref 13–39)
BILIRUB SERPL-MCNC: 0.55 MG/DL (ref 0.2–1)
BUN SERPL-MCNC: 10 MG/DL (ref 5–25)
CALCIUM SERPL-MCNC: 9.4 MG/DL (ref 8.4–10.2)
CHLORIDE SERPL-SCNC: 102 MMOL/L (ref 96–108)
CHOLEST SERPL-MCNC: 204 MG/DL
CO2 SERPL-SCNC: 27 MMOL/L (ref 21–32)
CREAT SERPL-MCNC: 0.73 MG/DL (ref 0.6–1.3)
GFR SERPL CREATININE-BSD FRML MDRD: 97 ML/MIN/1.73SQ M
GLUCOSE P FAST SERPL-MCNC: 78 MG/DL (ref 65–99)
HDLC SERPL-MCNC: 54 MG/DL
IRON SATN MFR SERPL: 23 % (ref 15–50)
IRON SERPL-MCNC: 68 UG/DL (ref 50–212)
LDLC SERPL CALC-MCNC: 124 MG/DL (ref 0–100)
NONHDLC SERPL-MCNC: 150 MG/DL
POTASSIUM SERPL-SCNC: 3.5 MMOL/L (ref 3.5–5.3)
PROT SERPL-MCNC: 6.6 G/DL (ref 6.4–8.4)
SODIUM SERPL-SCNC: 139 MMOL/L (ref 135–147)
TIBC SERPL-MCNC: 290 UG/DL (ref 250–450)
TRIGL SERPL-MCNC: 131 MG/DL
UIBC SERPL-MCNC: 222 UG/DL (ref 155–355)

## 2024-02-23 ENCOUNTER — APPOINTMENT (OUTPATIENT)
Dept: LAB | Facility: CLINIC | Age: 49
End: 2024-02-23
Payer: COMMERCIAL

## 2024-02-23 DIAGNOSIS — Z11.1 SCREENING EXAMINATION FOR PULMONARY TUBERCULOSIS: ICD-10-CM

## 2024-02-23 PROCEDURE — 86480 TB TEST CELL IMMUN MEASURE: CPT

## 2024-02-23 PROCEDURE — 36415 COLL VENOUS BLD VENIPUNCTURE: CPT

## 2024-02-24 LAB
GAMMA INTERFERON BACKGROUND BLD IA-ACNC: 0.08 IU/ML
M TB IFN-G BLD-IMP: NEGATIVE
M TB IFN-G CD4+ BCKGRND COR BLD-ACNC: -0.01 IU/ML
M TB IFN-G CD4+ BCKGRND COR BLD-ACNC: -0.01 IU/ML
MITOGEN IGNF BCKGRD COR BLD-ACNC: 9.92 IU/ML

## 2024-02-29 ENCOUNTER — CONSULT (OUTPATIENT)
Dept: GYNECOLOGY | Facility: CLINIC | Age: 49
End: 2024-02-29
Payer: COMMERCIAL

## 2024-02-29 VITALS
HEART RATE: 84 BPM | SYSTOLIC BLOOD PRESSURE: 160 MMHG | WEIGHT: 199 LBS | BODY MASS INDEX: 28.49 KG/M2 | DIASTOLIC BLOOD PRESSURE: 100 MMHG | HEIGHT: 70 IN

## 2024-02-29 DIAGNOSIS — N81.6 RECTOCELE: ICD-10-CM

## 2024-02-29 DIAGNOSIS — Z01.818 PRE-OP TESTING: Primary | ICD-10-CM

## 2024-02-29 DIAGNOSIS — N81.6 RECTOCELE: Primary | ICD-10-CM

## 2024-02-29 PROCEDURE — 99215 OFFICE O/P EST HI 40 MIN: CPT | Performed by: OBSTETRICS & GYNECOLOGY

## 2024-02-29 NOTE — PROGRESS NOTES
Assessment/Plan:         Diagnoses and all orders for this visit:    Rectocele  ; discussed surgical correction.  Plan is to proceed with a posterior colporrhaphy.  The procedure along with risks were reviewed.      Visit 45 minutes with greater than 50% discussion and coordinating care    Subjective:      Patient ID: Hiral Snyder is a 48 y.o. female.    HPI patient presents to the office complaining of increasing vaginal pressure and rectal pressure.  She denies any vaginal discharge or bleeding.  Denies any hematochezia.  She did have a colonoscopy in 2023 which was negative.  She does require stenting to evacuate her rectum.  Denies any dysuria, hematuria urgency or urinary incontinence.  She is status post LSH in February 2020.    The following portions of the patient's history were reviewed and updated as appropriate: She  has a past medical history of Abnormal uterine bleeding, Anemia, Anxiety, Depression, Fibroid uterus, GERD (gastroesophageal reflux disease), Hard of hearing, Headache, Hypertension, Hypertension, Psychiatric disorder, and Skin abnormality.  She   Patient Active Problem List    Diagnosis Date Noted    Uterine leiomyoma 01/23/2020    Anemia 01/23/2020    Excessive or frequent menstruation 01/23/2020    Ankle fracture 08/19/2019    Closed trimalleolar fracture of left ankle with routine healing 08/08/2019     She  has a past surgical history that includes Tubal ligation; Fracture surgery (Left); pr laps supracrv hysterect 250 gm/< rmvl tube/ovar (N/A, 02/10/2020); Myomectomy; and Hysterectomy (2/10/2020).  Her family history includes Diabetes in her brother; Hypertension in her father and mother.  She  reports that she has never smoked. She has never used smokeless tobacco. She reports that she does not currently use alcohol. She reports that she does not currently use drugs after having used the following drugs: Marijuana. Frequency: 5.00 times per week.  Current Outpatient Medications  "  Medication Sig Dispense Refill    buPROPion (WELLBUTRIN SR) 200 MG 12 hr tablet Take 200 mg by mouth daily      hydrochlorothiazide (HYDRODIURIL) 25 mg tablet Take 25 mg by mouth daily      lisinopril (ZESTRIL) 10 mg tablet Take 10 mg by mouth daily      loratadine (CLARITIN) 10 mg tablet Take 10 mg by mouth daily      Magnesium 250 MG TABS Take 1 tablet by mouth daily      Multiple Vitamins-Minerals (MULTIVITAMIN WOMEN PO) Take 1 tablet by mouth daily      zinc gluconate 50 mg tablet Take 50 mg by mouth daily       No current facility-administered medications for this visit.     Current Outpatient Medications on File Prior to Visit   Medication Sig    buPROPion (WELLBUTRIN SR) 200 MG 12 hr tablet Take 200 mg by mouth daily    hydrochlorothiazide (HYDRODIURIL) 25 mg tablet Take 25 mg by mouth daily    lisinopril (ZESTRIL) 10 mg tablet Take 10 mg by mouth daily    loratadine (CLARITIN) 10 mg tablet Take 10 mg by mouth daily    Magnesium 250 MG TABS Take 1 tablet by mouth daily    Multiple Vitamins-Minerals (MULTIVITAMIN WOMEN PO) Take 1 tablet by mouth daily    zinc gluconate 50 mg tablet Take 50 mg by mouth daily     No current facility-administered medications on file prior to visit.     She has No Known Allergies..    Review of Systems      Objective:      /100   Pulse 84   Ht 5' 10\" (1.778 m)   Wt 90.3 kg (199 lb)   LMP 01/16/2020 (Exact Date) Comment: urine HCG negative  BMI 28.55 kg/m²          Physical Exam  Vitals reviewed.   Cardiovascular:      Rate and Rhythm: Normal rate and regular rhythm.      Pulses: Normal pulses.      Heart sounds: Normal heart sounds.   Pulmonary:      Effort: Pulmonary effort is normal.      Breath sounds: Normal breath sounds.   Abdominal:      General: There is no distension.      Palpations: Abdomen is soft. There is no mass.      Tenderness: There is no abdominal tenderness. There is no guarding or rebound.      Hernia: No hernia is present. There is no hernia in " the left inguinal area or right inguinal area.   Genitourinary:     General: Normal vulva.      Labia:         Right: No rash, tenderness or lesion.         Left: No rash, tenderness or lesion.       Vagina: Normal.      Cervix: Normal.      Uterus: Absent.       Adnexa:         Right: No mass, tenderness or fullness.          Left: No mass, tenderness or fullness.        Rectum: No mass, tenderness or anal fissure.      Comments: G3 rectocele  Lymphadenopathy:      Lower Body: No right inguinal adenopathy. No left inguinal adenopathy.   Neurological:      Mental Status: She is alert.

## 2024-05-20 PROCEDURE — NC001 PR NO CHARGE: Performed by: OBSTETRICS & GYNECOLOGY

## 2024-05-20 NOTE — H&P
Assessment/Plan:            Diagnoses and all orders for this visit:     Rectocele  ; discussed surgical correction.  Plan is to proceed with a posterior colporrhaphy.  The procedure along with risks were reviewed.             Subjective:       Patient ID: Hiral Snyder is a 48 y.o. female.     HPI patient presents to the office complaining of increasing vaginal pressure and rectal pressure.  She denies any vaginal discharge or bleeding.  Denies any hematochezia.  She did have a colonoscopy in 2023 which was negative.  She does require stenting to evacuate her rectum.  Denies any dysuria, hematuria urgency or urinary incontinence.  She is status post LSH in February 2020.     The following portions of the patient's history were reviewed and updated as appropriate: She  has a past medical history of Abnormal uterine bleeding, Anemia, Anxiety, Depression, Fibroid uterus, GERD (gastroesophageal reflux disease), Hard of hearing, Headache, Hypertension, Hypertension, Psychiatric disorder, and Skin abnormality.  She        Patient Active Problem List     Diagnosis Date Noted    Uterine leiomyoma 01/23/2020    Anemia 01/23/2020    Excessive or frequent menstruation 01/23/2020    Ankle fracture 08/19/2019    Closed trimalleolar fracture of left ankle with routine healing 08/08/2019      She  has a past surgical history that includes Tubal ligation; Fracture surgery (Left); pr laps supracrv hysterect 250 gm/< rmvl tube/ovar (N/A, 02/10/2020); Myomectomy; and Hysterectomy (2/10/2020).  Her family history includes Diabetes in her brother; Hypertension in her father and mother.  She  reports that she has never smoked. She has never used smokeless tobacco. She reports that she does not currently use alcohol. She reports that she does not currently use drugs after having used the following drugs: Marijuana. Frequency: 5.00 times per week.  Current Rx          Current Outpatient Medications   Medication Sig Dispense Refill     "buPROPion (WELLBUTRIN SR) 200 MG 12 hr tablet Take 200 mg by mouth daily        hydrochlorothiazide (HYDRODIURIL) 25 mg tablet Take 25 mg by mouth daily        lisinopril (ZESTRIL) 10 mg tablet Take 10 mg by mouth daily        loratadine (CLARITIN) 10 mg tablet Take 10 mg by mouth daily        Magnesium 250 MG TABS Take 1 tablet by mouth daily        Multiple Vitamins-Minerals (MULTIVITAMIN WOMEN PO) Take 1 tablet by mouth daily        zinc gluconate 50 mg tablet Take 50 mg by mouth daily          No current facility-administered medications for this visit.              Current Outpatient Medications on File Prior to Visit   Medication Sig    buPROPion (WELLBUTRIN SR) 200 MG 12 hr tablet Take 200 mg by mouth daily    hydrochlorothiazide (HYDRODIURIL) 25 mg tablet Take 25 mg by mouth daily    lisinopril (ZESTRIL) 10 mg tablet Take 10 mg by mouth daily    loratadine (CLARITIN) 10 mg tablet Take 10 mg by mouth daily    Magnesium 250 MG TABS Take 1 tablet by mouth daily    Multiple Vitamins-Minerals (MULTIVITAMIN WOMEN PO) Take 1 tablet by mouth daily    zinc gluconate 50 mg tablet Take 50 mg by mouth daily      No current facility-administered medications on file prior to visit.      She has No Known Allergies..     Review of Systems       Objective:        /100   Pulse 84   Ht 5' 10\" (1.778 m)   Wt 90.3 kg (199 lb)   LMP 01/16/2020 (Exact Date) Comment: urine HCG negative  BMI 28.55 kg/m²             Physical Exam  Vitals reviewed.   Cardiovascular:      Rate and Rhythm: Normal rate and regular rhythm.      Pulses: Normal pulses.      Heart sounds: Normal heart sounds.   Pulmonary:      Effort: Pulmonary effort is normal.      Breath sounds: Normal breath sounds.   Abdominal:      General: There is no distension.      Palpations: Abdomen is soft. There is no mass.      Tenderness: There is no abdominal tenderness. There is no guarding or rebound.      Hernia: No hernia is present. There is no hernia in the " left inguinal area or right inguinal area.   Genitourinary:     General: Normal vulva.      Labia:         Right: No rash, tenderness or lesion.         Left: No rash, tenderness or lesion.       Vagina: Normal.      Cervix: Normal.      Uterus: Absent.       Adnexa:         Right: No mass, tenderness or fullness.          Left: No mass, tenderness or fullness.        Rectum: No mass, tenderness or anal fissure.      Comments: G3 rectocele  Lymphadenopathy:      Lower Body: No right inguinal adenopathy. No left inguinal adenopathy.   Neurological:      Mental Status: She is alert.

## 2024-05-24 ENCOUNTER — ANESTHESIA EVENT (OUTPATIENT)
Dept: PERIOP | Facility: HOSPITAL | Age: 49
End: 2024-05-24
Payer: COMMERCIAL

## 2024-05-30 RX ORDER — FAMOTIDINE 20 MG/1
20 TABLET, FILM COATED ORAL 2 TIMES DAILY PRN
COMMUNITY
Start: 2024-04-15

## 2024-05-30 RX ORDER — DEXTROAMPHETAMINE SACCHARATE, AMPHETAMINE ASPARTATE, DEXTROAMPHETAMINE SULFATE AND AMPHETAMINE SULFATE 5; 5; 5; 5 MG/1; MG/1; MG/1; MG/1
20 TABLET ORAL
COMMUNITY

## 2024-05-30 RX ORDER — SERTRALINE HYDROCHLORIDE 100 MG/1
TABLET, FILM COATED ORAL
COMMUNITY
Start: 2024-05-24

## 2024-05-30 NOTE — PRE-PROCEDURE INSTRUCTIONS
Pre-Surgery Instructions:   Medication Instructions    amphetamine-dextroamphetamine (ADDERALL) 20 mg tablet Hold day of surgery.    buPROPion (WELLBUTRIN SR) 200 MG 12 hr tablet Take day of surgery.    famotidine (PEPCID) 20 mg tablet Take day of surgery.    hydrochlorothiazide (HYDRODIURIL) 25 mg tablet Hold day of surgery.    lisinopril (ZESTRIL) 10 mg tablet Hold day of surgery.    loratadine (CLARITIN) 10 mg tablet Take day of surgery.    Magnesium 250 MG TABS Stop taking 7 days prior to surgery.    Multiple Vitamins-Minerals (MULTIVITAMIN WOMEN PO) Stop taking 7 days prior to surgery.    sertraline (ZOLOFT) 100 mg tablet Take day of surgery.    zinc gluconate 50 mg tablet Stop taking 7 days prior to surgery.   Medication instructions for day surgery reviewed. Please use only a sip of water to take your instructed medications. Avoid all over the counter vitamins, supplements and NSAIDS for one week prior to surgery per anesthesia guidelines. Tylenol is ok to take as needed.     You will receive a call one business day prior to surgery with an arrival time and hospital directions. If your surgery is scheduled on a Monday, the hospital will be calling you on the Friday prior to your surgery. If you have not heard from anyone by 8pm, please call the hospital supervisor through the hospital  at [phone]. ([place] [phone] or [place] [phone]).    Do not eat or drink anything after midnight the night before your surgery, including candy, mints, lifesavers, or chewing gum. Do not drink alcohol 24hrs before your surgery. Try not to smoke at least 24hrs before your surgery.       Follow the pre surgery showering instructions as listed in the “My Surgical Experience Booklet” or otherwise provided by your surgeon's office. Do not use a blade to shave the surgical area 1 week before surgery. It is okay to use a clean electric clippers up to 24 hours before surgery. Do not apply any lotions, creams,  including makeup, cologne, deodorant, or perfumes after showering on the day of your surgery. Do not use dry shampoo, hair spray, hair gel, or any type of hair products.     No contact lenses, eye make-up, or artificial eyelashes. Remove nail polish, including gel polish, and any artificial, gel, or acrylic nails if possible. Remove all jewelry including rings and body piercing jewelry.     Wear causal clothing that is easy to take on and off. Consider your type of surgery.    Keep any valuables, jewelry, piercings at home. Please bring any specially ordered equipment (sling, braces) if indicated.    Arrange for a responsible person to drive you to and from the hospital on the day of your surgery. Please confirm the visitor policy for the day of your procedure when you receive your phone call with an arrival time.     Call the surgeon's office with any new illnesses, exposures, or additional questions prior to surgery.    Please reference your “My Surgical Experience Booklet” for additional information to prepare for your upcoming surgery.

## 2024-06-05 ENCOUNTER — APPOINTMENT (OUTPATIENT)
Dept: LAB | Facility: CLINIC | Age: 49
End: 2024-06-05
Payer: COMMERCIAL

## 2024-06-05 DIAGNOSIS — Z01.818 PRE-OP TESTING: ICD-10-CM

## 2024-06-05 LAB
BASOPHILS # BLD AUTO: 0.02 THOUSANDS/ÂΜL (ref 0–0.1)
BASOPHILS NFR BLD AUTO: 0 % (ref 0–1)
EOSINOPHIL # BLD AUTO: 0.07 THOUSAND/ÂΜL (ref 0–0.61)
EOSINOPHIL NFR BLD AUTO: 1 % (ref 0–6)
ERYTHROCYTE [DISTWIDTH] IN BLOOD BY AUTOMATED COUNT: 12.4 % (ref 11.6–15.1)
HCT VFR BLD AUTO: 39.2 % (ref 34.8–46.1)
HGB BLD-MCNC: 13.3 G/DL (ref 11.5–15.4)
IMM GRANULOCYTES # BLD AUTO: 0.02 THOUSAND/UL (ref 0–0.2)
IMM GRANULOCYTES NFR BLD AUTO: 0 % (ref 0–2)
LYMPHOCYTES # BLD AUTO: 1.9 THOUSANDS/ÂΜL (ref 0.6–4.47)
LYMPHOCYTES NFR BLD AUTO: 34 % (ref 14–44)
MCH RBC QN AUTO: 31 PG (ref 26.8–34.3)
MCHC RBC AUTO-ENTMCNC: 33.9 G/DL (ref 31.4–37.4)
MCV RBC AUTO: 91 FL (ref 82–98)
MONOCYTES # BLD AUTO: 0.31 THOUSAND/ÂΜL (ref 0.17–1.22)
MONOCYTES NFR BLD AUTO: 6 % (ref 4–12)
NEUTROPHILS # BLD AUTO: 3.34 THOUSANDS/ÂΜL (ref 1.85–7.62)
NEUTS SEG NFR BLD AUTO: 59 % (ref 43–75)
NRBC BLD AUTO-RTO: 0 /100 WBCS
PLATELET # BLD AUTO: 267 THOUSANDS/UL (ref 149–390)
PMV BLD AUTO: 10.1 FL (ref 8.9–12.7)
RBC # BLD AUTO: 4.29 MILLION/UL (ref 3.81–5.12)
WBC # BLD AUTO: 5.66 THOUSAND/UL (ref 4.31–10.16)

## 2024-06-05 PROCEDURE — 85025 COMPLETE CBC W/AUTO DIFF WBC: CPT

## 2024-06-05 PROCEDURE — 36415 COLL VENOUS BLD VENIPUNCTURE: CPT

## 2024-06-10 ENCOUNTER — HOSPITAL ENCOUNTER (OUTPATIENT)
Facility: HOSPITAL | Age: 49
Setting detail: OUTPATIENT SURGERY
Discharge: HOME | End: 2024-06-10
Attending: OBSTETRICS & GYNECOLOGY | Admitting: OBSTETRICS & GYNECOLOGY
Payer: COMMERCIAL

## 2024-06-10 ENCOUNTER — ANESTHESIA (OUTPATIENT)
Dept: PERIOP | Facility: HOSPITAL | Age: 49
End: 2024-06-10
Payer: COMMERCIAL

## 2024-06-10 VITALS
DIASTOLIC BLOOD PRESSURE: 98 MMHG | OXYGEN SATURATION: 100 % | BODY MASS INDEX: 29.32 KG/M2 | TEMPERATURE: 96.8 F | WEIGHT: 204.37 LBS | RESPIRATION RATE: 16 BRPM | SYSTOLIC BLOOD PRESSURE: 152 MMHG | HEART RATE: 65 BPM

## 2024-06-10 DIAGNOSIS — N81.6 RECTOCELE: ICD-10-CM

## 2024-06-10 PROCEDURE — 57250 REPAIR RECTUM & VAGINA: CPT | Performed by: OBSTETRICS & GYNECOLOGY

## 2024-06-10 RX ORDER — SODIUM CHLORIDE 9 MG/ML
125 INJECTION, SOLUTION INTRAVENOUS CONTINUOUS
Status: DISCONTINUED | OUTPATIENT
Start: 2024-06-10 | End: 2024-06-10

## 2024-06-10 RX ORDER — IBUPROFEN 800 MG/1
800 TABLET ORAL EVERY 6 HOURS PRN
Start: 2024-06-10 | End: 2024-06-11

## 2024-06-10 RX ORDER — BUPIVACAINE HYDROCHLORIDE AND EPINEPHRINE 2.5; 5 MG/ML; UG/ML
INJECTION, SOLUTION INFILTRATION; PERINEURAL AS NEEDED
Status: DISCONTINUED | OUTPATIENT
Start: 2024-06-10 | End: 2024-06-10 | Stop reason: HOSPADM

## 2024-06-10 RX ORDER — ONDANSETRON 2 MG/ML
INJECTION INTRAMUSCULAR; INTRAVENOUS AS NEEDED
Status: DISCONTINUED | OUTPATIENT
Start: 2024-06-10 | End: 2024-06-10

## 2024-06-10 RX ORDER — IBUPROFEN 800 MG/1
800 TABLET ORAL EVERY 6 HOURS PRN
Start: 2024-06-10 | End: 2024-06-10

## 2024-06-10 RX ORDER — FENTANYL CITRATE 50 UG/ML
INJECTION, SOLUTION INTRAMUSCULAR; INTRAVENOUS AS NEEDED
Status: DISCONTINUED | OUTPATIENT
Start: 2024-06-10 | End: 2024-06-10

## 2024-06-10 RX ORDER — IBUPROFEN 600 MG/1
600 TABLET ORAL EVERY 6 HOURS PRN
Status: DISCONTINUED | OUTPATIENT
Start: 2024-06-10 | End: 2024-06-10 | Stop reason: HOSPADM

## 2024-06-10 RX ORDER — MIDAZOLAM HYDROCHLORIDE 2 MG/2ML
INJECTION, SOLUTION INTRAMUSCULAR; INTRAVENOUS AS NEEDED
Status: DISCONTINUED | OUTPATIENT
Start: 2024-06-10 | End: 2024-06-10

## 2024-06-10 RX ORDER — CEFAZOLIN SODIUM 1 G/3ML
INJECTION, POWDER, FOR SOLUTION INTRAMUSCULAR; INTRAVENOUS AS NEEDED
Status: DISCONTINUED | OUTPATIENT
Start: 2024-06-10 | End: 2024-06-10

## 2024-06-10 RX ORDER — PROPOFOL 10 MG/ML
INJECTION, EMULSION INTRAVENOUS CONTINUOUS PRN
Status: DISCONTINUED | OUTPATIENT
Start: 2024-06-10 | End: 2024-06-10

## 2024-06-10 RX ORDER — ONDANSETRON 2 MG/ML
4 INJECTION INTRAMUSCULAR; INTRAVENOUS ONCE AS NEEDED
Status: DISCONTINUED | OUTPATIENT
Start: 2024-06-10 | End: 2024-06-10 | Stop reason: HOSPADM

## 2024-06-10 RX ORDER — ACETAMINOPHEN 325 MG/1
975 TABLET ORAL EVERY 6 HOURS PRN
Status: DISCONTINUED | OUTPATIENT
Start: 2024-06-10 | End: 2024-06-10 | Stop reason: HOSPADM

## 2024-06-10 RX ORDER — LABETALOL HYDROCHLORIDE 5 MG/ML
INJECTION, SOLUTION INTRAVENOUS
Status: COMPLETED
Start: 2024-06-10 | End: 2024-06-10

## 2024-06-10 RX ORDER — ONDANSETRON 2 MG/ML
4 INJECTION INTRAMUSCULAR; INTRAVENOUS EVERY 6 HOURS PRN
Status: DISCONTINUED | OUTPATIENT
Start: 2024-06-10 | End: 2024-06-10 | Stop reason: HOSPADM

## 2024-06-10 RX ORDER — PROPOFOL 10 MG/ML
INJECTION, EMULSION INTRAVENOUS AS NEEDED
Status: DISCONTINUED | OUTPATIENT
Start: 2024-06-10 | End: 2024-06-10

## 2024-06-10 RX ORDER — KETOROLAC TROMETHAMINE 30 MG/ML
INJECTION, SOLUTION INTRAMUSCULAR; INTRAVENOUS AS NEEDED
Status: DISCONTINUED | OUTPATIENT
Start: 2024-06-10 | End: 2024-06-10

## 2024-06-10 RX ORDER — LABETALOL HYDROCHLORIDE 5 MG/ML
10 INJECTION, SOLUTION INTRAVENOUS ONCE
Status: COMPLETED | OUTPATIENT
Start: 2024-06-10 | End: 2024-06-10

## 2024-06-10 RX ORDER — FENTANYL CITRATE/PF 50 MCG/ML
50 SYRINGE (ML) INJECTION
Status: DISCONTINUED | OUTPATIENT
Start: 2024-06-10 | End: 2024-06-10 | Stop reason: HOSPADM

## 2024-06-10 RX ORDER — CEFAZOLIN SODIUM 2 G/50ML
2000 SOLUTION INTRAVENOUS ONCE
Status: DISCONTINUED | OUTPATIENT
Start: 2024-06-10 | End: 2024-06-10

## 2024-06-10 RX ADMIN — KETOROLAC TROMETHAMINE 30 MG: 30 INJECTION, SOLUTION INTRAMUSCULAR; INTRAVENOUS at 13:11

## 2024-06-10 RX ADMIN — PROPOFOL 120 MCG/KG/MIN: 10 INJECTION, EMULSION INTRAVENOUS at 12:18

## 2024-06-10 RX ADMIN — CEFAZOLIN 2000 MG: 1 INJECTION, POWDER, FOR SOLUTION INTRAMUSCULAR; INTRAVENOUS; PARENTERAL at 12:15

## 2024-06-10 RX ADMIN — PROPOFOL 40 MG: 10 INJECTION, EMULSION INTRAVENOUS at 12:17

## 2024-06-10 RX ADMIN — IBUPROFEN 600 MG: 600 TABLET, FILM COATED ORAL at 14:30

## 2024-06-10 RX ADMIN — LABETALOL HYDROCHLORIDE 10 MG: 5 INJECTION, SOLUTION INTRAVENOUS at 11:26

## 2024-06-10 RX ADMIN — FENTANYL CITRATE 50 MCG: 50 INJECTION INTRAMUSCULAR; INTRAVENOUS at 12:28

## 2024-06-10 RX ADMIN — SODIUM CHLORIDE 125 ML/HR: 0.9 INJECTION, SOLUTION INTRAVENOUS at 11:22

## 2024-06-10 RX ADMIN — FENTANYL CITRATE 50 MCG: 50 INJECTION INTRAMUSCULAR; INTRAVENOUS at 12:12

## 2024-06-10 RX ADMIN — SODIUM CHLORIDE: 0.9 INJECTION, SOLUTION INTRAVENOUS at 12:45

## 2024-06-10 RX ADMIN — PROPOFOL 30 MG: 10 INJECTION, EMULSION INTRAVENOUS at 12:27

## 2024-06-10 RX ADMIN — FENTANYL CITRATE 50 MCG: 50 INJECTION INTRAMUSCULAR; INTRAVENOUS at 13:34

## 2024-06-10 RX ADMIN — ONDANSETRON 4 MG: 2 INJECTION INTRAMUSCULAR; INTRAVENOUS at 12:38

## 2024-06-10 RX ADMIN — MIDAZOLAM 2 MG: 1 INJECTION INTRAMUSCULAR; INTRAVENOUS at 12:12

## 2024-06-10 NOTE — ANESTHESIA POSTPROCEDURE EVALUATION
Post-Op Assessment Note    CV Status:  Stable    Pain management: adequate    Multimodal analgesia used between 6 hours prior to anesthesia start to PACU discharge    Mental Status:  Awake and alert   Hydration Status:  Stable   PONV Controlled:  None   Airway Patency:  Patent  Two or more mitigation strategies used for obstructive sleep apnea   Post Op Vitals Reviewed: Yes    No anethesia notable event occurred.    Staff: CRNA               BP   158/93   Temp      Pulse  75   Resp   18   SpO2   98

## 2024-06-10 NOTE — OP NOTE
OPERATIVE REPORT  PATIENT NAME: Hiral Snyder    :  1975  MRN: [id number]  Pt Location: [facility] OR ROOM 01    SURGERY DATE: 6/10/2024    Surgeons and Role:     * Ruiz Meadows DO - Primary     * Whit Tirado MD - Assisting     * Hugo Jackson MD - Fellow    Preop Diagnosis:  Rectocele [N81.6]    Post-Op Diagnosis Codes:     * Rectocele [N81.6]    Procedure(s):  COLPORRHAPHY POST REPAIR; EUA    Specimen(s):  * No specimens in log *    Estimated Blood Loss:   Minimal    Drains:  * No LDAs found *    Anesthesia Type:   General    Operative Indications:  Rectocele [N81.6]    Operative Findings:  Normal vaginal mucosa  Grade 3 rectocele    Complications:   None    Procedure and Technique:  Appropriate preoperative antibiotics chosen per ACOG guidelines were given.  Bilateral SCDs were placed in the lower extremities for DVT prevention prior to the institution of anesthesia.    The patient was identified in the holding area by the operating room staff and attending physician. She was taken to the operating room where anesthesia was instituted without complications. She was placed in the dorsal lithotomy position with the legs in Frederick stirrups with care taken to avoid excessive flexion or extension of her lower extremities. The patient was prepped and draped in the usual sterile fashion and straight cathed for 50cc of clear yellow urine.     Attention was turned to the posterior compartment where 2 Allis clamps were placed in the posterior fourchette over the mucocutaneous border to reduce the markedly relaxed vaginal outlet. A jessica-shaped incision was made extending from the vaginal mucosa just to inside the perineum. The overlying skin was removed en bloc. With a concomitant digital rectal examination, the rectovaginal space was entered sharply and the incision was extended superiorly.  We dissected the rectovaginal space down to the level of the rectal muscularis layer. Next, the muscularis  layer was plicated with a 2-0 Vicryl in a side-side fashion. The vaginal epithelium was trimmed and closed completely with 2-0 Vicryl suture in a running locked fashion. We reapproximated the perineal body with a 0-Vicryl interrupted suture x 2. We closed the remainder of the colporrhaphy to the level of the hymen. We closed the perineal skin with 2-0 Vicryl in a subcuticular fashion.    The sponge, needle and instrument count were correct x 2. The patient tolerated the procedure well. She was awakened from anesthesia and transferred to the recovery room in stable condition. No bladder, ureteral, viscus, or solid organ injury were noted at the end of the procedure. Dr. Meadows was present for the entire procedure.    I was present for the entire procedure.    Patient Disposition:  PACU         SIGNATURE: Hugo Jcakson MD  DATE: Paz 10, 2024  TIME: 1:14 PM

## 2024-06-10 NOTE — ANESTHESIA PREPROCEDURE EVALUATION
Procedure:  COLPORRHAPHY POST REPAIR; EUA (Vagina )    Relevant Problems   HEMATOLOGY   (+) Anemia        Physical Exam    Airway    Mallampati score: III  TM Distance: >3 FB  Neck ROM: full     Dental   No notable dental hx     Cardiovascular  Cardiovascular exam normal    Pulmonary  Pulmonary exam normal     Other Findings  post-pubertal.    /110. Held lisinopril. Will treat with labetalol.  Anesthesia Plan  ASA Score- 2     Anesthesia Type- IV sedation with anesthesia with ASA Monitors.         Additional Monitors:     Airway Plan:            Plan Factors-Exercise tolerance (METS): >4 METS.    Chart reviewed. EKG reviewed.  Existing labs reviewed. Patient summary reviewed.    Patient is not a current smoker.              Induction- intravenous.    Postoperative Plan- Plan for postoperative opioid use.         Informed Consent- Anesthetic plan and risks discussed with patient.

## 2024-06-11 ENCOUNTER — NURSE TRIAGE (OUTPATIENT)
Age: 49
End: 2024-06-11

## 2024-06-11 DIAGNOSIS — G89.18 POSTOPERATIVE PAIN: Primary | ICD-10-CM

## 2024-06-11 RX ORDER — IBUPROFEN 600 MG/1
600 TABLET ORAL EVERY 6 HOURS PRN
Qty: 30 TABLET | Refills: 0 | Status: SHIPPED | OUTPATIENT
Start: 2024-06-11

## 2024-06-11 RX ORDER — IBUPROFEN 600 MG/1
600 TABLET ORAL EVERY 6 HOURS PRN
Qty: 30 TABLET | Refills: 0 | Status: SHIPPED | OUTPATIENT
Start: 2024-06-11 | End: 2024-06-11

## 2024-06-11 NOTE — TELEPHONE ENCOUNTER
"Patient is calling in. She had surgery yesterday. The Rx for Ibuprofen was prescribed as \"oral.\" She went to pick it up and it wasn't there. Can you please resend to the pharmacy for her. She uses the [facility].     She wanted to know what stool softener to take. Discussed Colace 2-3x daily, hydration and getting up and walking to help stimulate bowel movement   "

## (undated) DEVICE — ADHESIVE SKIN HIGH VISCOSITY EXOFIN 1ML

## (undated) DEVICE — GLOVE PI ULTRA TOUCH SZ.7.5

## (undated) DEVICE — [HIGH FLOW INSUFFLATOR,  DO NOT USE IF PACKAGE IS DAMAGED,  KEEP DRY,  KEEP AWAY FROM SUNLIGHT,  PROTECT FROM HEAT AND RADIOACTIVE SOURCES.]: Brand: PNEUMOSURE

## (undated) DEVICE — SYRINGE 20ML LL

## (undated) DEVICE — SCD SEQUENTIAL COMPRESSION COMFORT SLEEVE MEDIUM KNEE LENGTH: Brand: KENDALL SCD

## (undated) DEVICE — INTENDED FOR TISSUE SEPARATION, AND OTHER PROCEDURES THAT REQUIRE A SHARP SURGICAL BLADE TO PUNCTURE OR CUT.: Brand: BARD-PARKER SAFETY BLADES SIZE 15, STERILE

## (undated) DEVICE — GAUZE SPONGES,USP TYPE VII GAUZE, 12 PLY: Brand: CURITY

## (undated) DEVICE — BLUE HEAT SCOPE WARMER

## (undated) DEVICE — TRAY FOLEY 16FR URIMETER SILICONE SURESTEP

## (undated) DEVICE — INSUFFLATION NEEDLE TO ESTABLISH PNEUMOPERITONEUM.: Brand: INSUFFLATION NEEDLE

## (undated) DEVICE — NEEDLE HYPO 22G X 1-1/2 IN

## (undated) DEVICE — TROCAR: Brand: KII FIOS FIRST ENTRY

## (undated) DEVICE — BETHLEHEM UNIVERSAL MINOR VAG: Brand: CARDINAL HEALTH

## (undated) DEVICE — PENCIL ELECTROSURG E-Z CLEAN -0035H

## (undated) DEVICE — GLOVE INDICATOR PI UNDERGLOVE SZ 8 BLUE

## (undated) DEVICE — PREMIUM DRY TRAY LF: Brand: MEDLINE INDUSTRIES, INC.

## (undated) DEVICE — BETHLEHEM UNIVERSAL GYN LAP PK: Brand: CARDINAL HEALTH

## (undated) DEVICE — INTENDED FOR TISSUE SEPARATION, AND OTHER PROCEDURES THAT REQUIRE A SHARP SURGICAL BLADE TO PUNCTURE OR CUT.: Brand: BARD-PARKER SAFETY BLADES SIZE 11, STERILE

## (undated) DEVICE — SUT SILK 0 CT-1 30 IN 424H

## (undated) DEVICE — SUT VICRYL 0 SH 27 IN J418

## (undated) DEVICE — UTERINE MANIPULATOR RUMI 6.7 X 10 CM

## (undated) DEVICE — SUT VICRYL 2-0 SH 27 IN UNDYED J417H

## (undated) DEVICE — TUBING SUCTION 5MM X 12 FT

## (undated) DEVICE — SUT PLAIN 3-0 PS-1 27 IN 1640H

## (undated) DEVICE — SUT MONOCRYL 4-0 PS-2 27 IN Y426H

## (undated) DEVICE — Device: Brand: OLYMPUS

## (undated) DEVICE — MAYO STAND COVER: Brand: CONVERTORS

## (undated) DEVICE — POOLE SUCTION HANDLE: Brand: CARDINAL HEALTH

## (undated) DEVICE — TROCAR: Brand: KII SLEEVE

## (undated) DEVICE — GLOVE INDICATOR PI UNDERGLOVE SZ 8.5 BLUE

## (undated) DEVICE — LIGASURE LAP SLR/DIV MARYLAND 5MM

## (undated) DEVICE — CHLORAPREP HI-LITE 26ML ORANGE

## (undated) DEVICE — KIT INCLUDES:GTB14, ALEXIS CONTAINED EXT SYS 5BXCNGL3, GELPOINT MINI ADV ACCESS PLATFORM: Brand: ALEXIS CONTAINED EXTRACTION SYSTEM WITH GELPOINT MINI ADVANCED ACCESS PLATFORM

## (undated) DEVICE — ELECTRODE LAP J HOOK E-Z CLEAN 33CM-0021

## (undated) DEVICE — 3000CC GUARDIAN II: Brand: GUARDIAN

## (undated) DEVICE — NEEDLE COUNTER LG W/RULER

## (undated) DEVICE — LAPAROSCOPIC SMOKE EVAC TUBING

## (undated) DEVICE — DRAPE EQUIPMENT RF WAND

## (undated) DEVICE — PLASTIC ADHESIVE BANDAGE: Brand: CURITY

## (undated) DEVICE — IRRIG ENDO FLO TUBING

## (undated) DEVICE — UNDYED BRAIDED (POLYGLACTIN 910), SYNTHETIC ABSORBABLE SUTURE: Brand: COATED VICRYL

## (undated) DEVICE — GLOVE PI ULTRA TOUCH SZ.8.0

## (undated) DEVICE — INTENDED FOR TISSUE SEPARATION, AND OTHER PROCEDURES THAT REQUIRE A SHARP SURGICAL BLADE TO PUNCTURE OR CUT.: Brand: BARD-PARKER SAFETY BLADES SIZE 10, STERILE